# Patient Record
Sex: MALE | Race: WHITE | NOT HISPANIC OR LATINO | Employment: FULL TIME | URBAN - METROPOLITAN AREA
[De-identification: names, ages, dates, MRNs, and addresses within clinical notes are randomized per-mention and may not be internally consistent; named-entity substitution may affect disease eponyms.]

---

## 2017-01-13 DIAGNOSIS — F41.9 ANXIETY: Primary | ICD-10-CM

## 2017-01-13 DIAGNOSIS — F10.21 ALCOHOL DEPENDENCE IN REMISSION (H): ICD-10-CM

## 2017-01-13 RX ORDER — GABAPENTIN 300 MG/1
600 CAPSULE ORAL 3 TIMES DAILY
Qty: 540 CAPSULE | Refills: 0 | Status: SHIPPED | OUTPATIENT
Start: 2017-01-13 | End: 2017-01-18

## 2017-01-13 NOTE — TELEPHONE ENCOUNTER
Last seen: 7/13/16  RTC: 6 months  Cancel: none  No-show: none  Next appt: 1/18/17    Incoming refill from [facility] via fax    Medication requested: gabapentin 300 mg capsule  Directions: Take 2 capsules TID  Qty: 540  Last refilled: 10/16/16    Medication refill approved per refill protocol

## 2017-01-18 ENCOUNTER — OFFICE VISIT (OUTPATIENT)
Dept: PSYCHIATRY | Facility: CLINIC | Age: 37
End: 2017-01-18
Attending: CLINICAL NURSE SPECIALIST
Payer: COMMERCIAL

## 2017-01-18 DIAGNOSIS — F33.0 MAJOR DEPRESSIVE DISORDER, RECURRENT EPISODE, MILD (H): ICD-10-CM

## 2017-01-18 DIAGNOSIS — F41.9 ANXIETY: Primary | ICD-10-CM

## 2017-01-18 DIAGNOSIS — F10.21 ALCOHOL DEPENDENCE IN REMISSION (H): ICD-10-CM

## 2017-01-18 RX ORDER — GABAPENTIN 300 MG/1
600 CAPSULE ORAL 3 TIMES DAILY
Qty: 540 CAPSULE | Refills: 0 | Status: SHIPPED | OUTPATIENT
Start: 2017-01-18 | End: 2017-07-07

## 2017-01-18 RX ORDER — ESCITALOPRAM OXALATE 20 MG/1
30 TABLET ORAL DAILY
Qty: 135 TABLET | Refills: 1 | Status: SHIPPED | OUTPATIENT
Start: 2017-01-18 | End: 2017-07-19

## 2017-01-18 NOTE — PROGRESS NOTES
Outpatient Psychiatry Progress Note     Provider: ADI Salvador CNS  Date: 2017  Service:  Medication follow up with counseling.   Patient Identification: Scar Cheng  : 1980   MRN: [id number]    Scar Cheng is a 36 year old year old male who presents for ongoing psychiatric care.  Scar Cheng was last seen in clinic on 16.   At that time,   Assessment & Plan       Scar Cheng is seen today for follow up and reports he has been more anxious and mild depression due to stress of work, first child soon to be born. He would like to increase Lexapro to see if this helps with symptoms.    Diagnosis  Axis 1: Major Depression, Recurrent, Mild, Anxiety, Alcohol Dependence in remission  Axis 2: none  Axis 3: See problem list in the medical record    Plan:  Medication: Increase Lexapro to 30mg and continue Gabapentin  OTC Recommendations: none  Lab Orders:  none  Referrals: none  Release of Information: none  Future Treatment Considerations:per symptoms  Return for Follow Up:6 months               2017  Today Scar reports he and his wife had their daughter about 4 months ago. Things have been going well.   He is thinking of leaving work with [employer].  In general his life is going well and anything he worries about is more of a luxury. Has been sober 4 and 1/2 years.  Side effects of medication include: none known  Psychiatric Review of Systems:  The patient endorses symptoms of depression: In the last 2 weeks several days anhedonia, depressed, sleep disturbance, appetite disturbance, feeling of failure, concentration problems.  He  patient endorses symptoms of anxiety : stable.   He endorses symptoms of sue including none.    He endorses symptoms of psychosis including no psychotic symptoms.       Review of Medical Systems:  Sleep: overall stable  Energy: stable  Concentration: stable  Appetite: stable  GI Concerns: none  Cardiac concerns: none  Neurological  "concerns: none  Other medical concerns: no new concerns  Current Substance Use:  Alcohol:denies  Other drugs:denies  Caffeine:not discussed  Nicotine: none  Past Medical History:   Past Medical History   Diagnosis Date     Depressive disorder      Anxiety      Substance abuse      Alcohol dependence (H) 8/17/2012     Patient Active Problem List   Diagnosis     Family history of colon cancer     CARDIOVASCULAR SCREENING; LDL GOAL LESS THAN 160     Major depressive disorder, recurrent episode, mild (H)     Anxiety       Allergies:   Allergies   Allergen Reactions     Peanuts [Nuts] Anaphylaxis          Current Medications     Current Outpatient Prescriptions Ordered in [facility]   Medication Sig Dispense Refill     gabapentin (NEURONTIN) 300 MG capsule Take 2 capsules (600 mg) by mouth 3 times daily 540 capsule 0     escitalopram (LEXAPRO) 20 MG tablet Take 1.5 tablets (30 mg) by mouth daily 135 tablet 1     EPINEPHrine (EPIPEN) 0.3 MG/0.3ML injection Inject 0.3 mLs (0.3 mg) into the muscle once as needed for anaphylaxis 2 each 1     Multiple Vitamins-Minerals (MULTIVITAMIN PO)        Omega-3 Fatty Acids (OMEGA-3 FISH OIL PO)        Vitamin Mixture (VITAMIN E COMPLETE PO)        No current Epic-ordered facility-administered medications on file.        Mental Status Exam     Appearance:  Casually dressed and Well groomed  Behavior/relationship to examiner/demeanor:  Cooperative  Orientation: Oriented to person, place, time and situation  Psychomotor: normal form  Speech Rate:  Normal  Speech Spontaneity:  Normal  Mood:  \"good\"  Affect:  Appropriate/mood-congruent  Thought Process (Associations):  Goal directed  Thought Content:  no overt psychosis, denies suicidal ideation, intent or thoughts and patient does not appear to be responding to internal stimuli  Abnormal Perception:  None  Attention/Concentration:  Normal  Language:  Intact  Insight:  Good  Judgment:  Good      Results     Vital signs: There were no vitals taken " for this visit.    Laboratory Data:  no new data    Assessment & Plan      Scar Cheng is seen today for follow up and reports his mood has been stable and he thinks that the increase dose of Lexapro has been helpful without side effects. He agrees with plan to continue current medications.    Diagnosis  Axis 1: Major Depression, Recurrent Mild, Alcohol Dependence in Remission  Axis 2: none  Axis 3: See problem list in the medical record    Plan:  Medication: Continue current medications  OTC Recommendations: none  Lab Orders:  none  Referrals: none  Release of Information: none  Future Treatment Considerations:per symptoms  Return for Follow Up:6 months   The risks, benefits, alternatives and side effects have been discussed and are understood by the patient. The patient understands the risks of using street drugs or alcohol. There are no medical contraindications, the patient agrees to treatment, and has the capacity to do so. The patient understands to call 911 or come to the nearest ED if life threatening or urgent symptoms present.  Over 50% of this time was spent counseling the patient and/or coordinating care regarding review of social and occupational functioning.  In addition patient was counseled on health and wellness practices to augment medication treatment of symptoms. See note for details.    Radha Burdick, APRN CNS 1/18/2017

## 2017-01-25 ASSESSMENT — PATIENT HEALTH QUESTIONNAIRE - PHQ9: SUM OF ALL RESPONSES TO PHQ QUESTIONS 1-9: 7

## 2017-04-25 DIAGNOSIS — Z91.010 ALLERGY HISTORY, PEANUTS: ICD-10-CM

## 2017-04-25 RX ORDER — EPINEPHRINE 0.3 MG/.3ML
0.3 INJECTION SUBCUTANEOUS
Status: CANCELLED | OUTPATIENT
Start: 2017-04-25

## 2017-04-25 NOTE — TELEPHONE ENCOUNTER
Patient is due for a clinic visit. Telephone call to patient, he is scheduled for a clinic visit on 05/04/17 at 11am      Yadira Marks RN  [facility]

## 2017-04-25 NOTE — TELEPHONE ENCOUNTER
EPINEPHrine (EPIPEN) 0.3 MG/0.3ML injection       Last Written Prescription Date: 11-  Last Fill Quantity: 2 each,  # refills: 1   Last Office Visit with [facility], [facility]HA[facility] or [facility] prescribing provider: 07-

## 2017-07-07 DIAGNOSIS — F10.21 ALCOHOL DEPENDENCE IN REMISSION (H): ICD-10-CM

## 2017-07-07 DIAGNOSIS — F41.9 ANXIETY: ICD-10-CM

## 2017-07-07 RX ORDER — GABAPENTIN 300 MG/1
600 CAPSULE ORAL 3 TIMES DAILY
Qty: 180 CAPSULE | Refills: 0 | Status: SHIPPED | OUTPATIENT
Start: 2017-07-07 | End: 2017-07-19

## 2017-07-19 ENCOUNTER — OFFICE VISIT (OUTPATIENT)
Dept: PSYCHIATRY | Facility: CLINIC | Age: 37
End: 2017-07-19
Attending: CLINICAL NURSE SPECIALIST
Payer: COMMERCIAL

## 2017-07-19 DIAGNOSIS — N52.2 DRUG-INDUCED ERECTILE DYSFUNCTION: ICD-10-CM

## 2017-07-19 DIAGNOSIS — F41.9 ANXIETY: ICD-10-CM

## 2017-07-19 DIAGNOSIS — F33.0 MAJOR DEPRESSIVE DISORDER, RECURRENT EPISODE, MILD (H): Primary | ICD-10-CM

## 2017-07-19 DIAGNOSIS — F10.21 ALCOHOL DEPENDENCE IN REMISSION (H): ICD-10-CM

## 2017-07-19 RX ORDER — GABAPENTIN 300 MG/1
600 CAPSULE ORAL 3 TIMES DAILY
Qty: 180 CAPSULE | Refills: 0 | Status: SHIPPED | OUTPATIENT
Start: 2017-07-19 | End: 2017-09-08

## 2017-07-19 RX ORDER — ESCITALOPRAM OXALATE 20 MG/1
30 TABLET ORAL DAILY
Qty: 135 TABLET | Refills: 1 | Status: SHIPPED | OUTPATIENT
Start: 2017-07-19 | End: 2018-01-26

## 2017-07-19 RX ORDER — TADALAFIL 2.5 MG/1
2.5 TABLET ORAL DAILY
Qty: 30 TABLET | Refills: 1 | Status: SHIPPED | OUTPATIENT
Start: 2017-07-19

## 2017-07-19 NOTE — MR AVS SNAPSHOT
After Visit Summary   7/19/2017    Scar Cheng    MRN: [id number]           Patient Information     Date Of Birth          1980        Visit Information        Provider Department      7/19/2017 10:15 AM Radha Burdick APRN CNS [facility]        Today's Diagnoses     Major depressive disorder, recurrent episode, mild (H)    -  1    Anxiety        Drug-induced erectile dysfunction        Alcohol dependence in remission (H)           Follow-ups after your visit        Follow-up notes from your care team     Return in about 6 months (around 1/19/2018).      Your next 10 appointments already scheduled     Jan 17, 2018 10:15 AM [facility]   Adult Med Follow UP with ADI Salvador CNS   Psychiatry Clinic ([facility])    [place] [id number] [facility] [address] [phone]              Who to contact     Please call your clinic at [phone] to:    Ask questions about your health    Make or cancel appointments    Discuss your medicines    Learn about your test results    Speak to your doctor   If you have compliments or concerns about an experience at your clinic, or if you wish to file a complaint, please contact [facility] Patient Relations at [phone] or email us at [email or web address]         Additional Information About Your Visit        MyChart Information     [employer] is an electronic gateway that provides easy, online access to your medical records. With [employer], you can request a clinic appointment, read your test results, renew a prescription or communicate with your care team.     To sign up for [employer] visit the website at [email or web address]   You will be asked to enter the access code listed below, as well as some personal information. Please follow the directions to create your username and password.     Your access code is: [email or web address]  Expires: 8/15/2017  1:51  PM     Your access code will  in 90 days. If you need help or a new code, please contact your [facility] or call [phone] for assistance.        Care EveryWhere ID     This is your Care EveryWhere ID. This could be used by other organizations to access your [place] medical records  [id number]         Blood Pressure from Last 3 Encounters:   16 118/72   16 124/70   06/03/15 115/67    Weight from Last 3 Encounters:   16 96.4 kg (212 lb 9.6 oz)   16 92.1 kg (203 lb)   06/03/15 90.7 kg (200 lb)              Today, you had the following     No orders found for display         Today's Medication Changes          These changes are accurate as of: 17 11:59 PM.  If you have any questions, ask your nurse or doctor.               Start taking these medicines.        Dose/Directions    Tadalafil 2.5 MG Tabs   Commonly known as:  CIALIS   Used for:  Drug-induced erectile dysfunction        Dose:  2.5 mg   Take 2.5 mg by mouth daily Never use with nitroglycerin, terazosin or doxazosin.   Quantity:  30 tablet   Refills:  1            Where to get your medicines      These medications were sent to [place] [address] [place] [address]  [address] [place], [place] [id number]     Phone:  [phone]     escitalopram 20 MG tablet    gabapentin 300 MG capsule    Tadalafil 2.5 MG Tabs                Primary Care Provider Office Phone # Fax #    Chica Ridley DO [place] [phone]       XXX RESIGNED XXX [address] [facility] [id number]        Equal Access to Services     ILIA LOPEZ AH: Hadkristyn Agee, betty schmitt, qajaneen meadalruddy blunt. So [facility] [phone].    ATENCIÓN: Si habla español, tiene a nieves disposición servicios gratuitos de asistencia lingüística. Llame al [phone].    We comply with applicable federal civil rights laws and [place] laws. We  do not discriminate on the basis of race, color, national origin, age, disability sex, sexual orientation or gender identity.            Thank you!     Thank you for choosing PSYCHIATRY CLINIC  for your care. Our goal is always to provide you with excellent care. Hearing back from our patients is one way we can continue to improve our services. Please take a few minutes to complete the written survey that you may receive in the mail after your visit with us. Thank you!             Your Updated Medication List - Protect others around you: Learn how to safely use, store and throw away your medicines at www.disposemymeds.org.          This list is accurate as of: 7/19/17 11:59 PM.  Always use your most recent med list.                   Brand Name Dispense Instructions for use Diagnosis    EPINEPHrine 0.3 MG/0.3ML injection 2-pack    EPIPEN/ADRENACLICK/or ANY BX GENERIC EQUIV    2 each    Inject 0.3 mLs (0.3 mg) into the muscle once as needed for anaphylaxis    Allergy history, peanuts       escitalopram 20 MG tablet    LEXAPRO    135 tablet    Take 1.5 tablets (30 mg) by mouth daily    Anxiety, Major depressive disorder, recurrent episode, mild (H)       esomeprazole 20 MG CR capsule    nexIUM     Take 1 capsule (20 mg) by mouth every morning (before breakfast) Take 30-60 minutes before eating.        gabapentin 300 MG capsule    NEURONTIN    180 capsule    Take 2 capsules (600 mg) by mouth 3 times daily    Anxiety, Alcohol dependence in remission (H)       MULTIVITAMIN PO           OMEGA-3 FISH OIL PO           Tadalafil 2.5 MG Tabs    CIALIS    30 tablet    Take 2.5 mg by mouth daily Never use with nitroglycerin, terazosin or doxazosin.    Drug-induced erectile dysfunction       VITAMIN E COMPLETE PO

## 2017-07-19 NOTE — PROGRESS NOTES
Outpatient Psychiatry Progress Note     Provider: ADI Salvador CNS  Date: 2017  Service:  Medication follow up with counseling.   Patient Identification: Scar Cheng  : 1980   MRN: [id number]    Scar Cheng is a 37 year old year old male who presents for ongoing psychiatric care.  Scar Cheng was last seen in clinic on 17.   At that time,   Assessment & Plan       Scar Cheng is seen today for follow up and reports his mood has been stable and he thinks that the increase dose of Lexapro has been helpful without side effects. He agrees with plan to continue current medications.     Diagnosis  Axis 1: Major Depression, Recurrent Mild, Alcohol Dependence in Remission  Axis 2: none  Axis 3: See problem list in the medical record     Plan:  Medication: Continue current medications  OTC Recommendations: none  Lab Orders:  none  Referrals: none  Release of Information: none  Future Treatment Considerations:per symptoms  Return for Follow Up:6 months      ____________________________________________________________________________________________________________________________________________    2017  Today Scar reports feeling well. He is now working for [employer] instead of with [employer]. He just started last week so not sure how it  He will like it but looking forward to have a stable job.    He noted increase in stress with the last start up he had GI problems.  Started Nexium for this and seems to help.   Side effects of medication include: sexual side effects  Psychiatric Review of Systems:  The patient endorses symptoms of depression: In the last 2 weeks per PHQ 9 denies except several days fatigue, appetite disturbance.  He  patient endorses symptoms of anxiety : see subjective.   He endorses symptoms of sue including none.    He endorses symptoms of psychosis including no psychotic symptoms.       Review of Medical Systems:  Sleep: no concerns. He continues  "to nuha regular exercise.    Energy: mild  Concentration: stable  Appetite: changed due to GERD  GI Concerns: see subjective  Cardiac concerns: none  Neurological concerns: none  Other medical concerns: no other concerns  Current Substance Use:  Alcohol:sober for 5 years next month  Other drugs:none  Caffeine:daily  Nicotine: none  Past Medical History:   Past Medical History:   Diagnosis Date     Alcohol dependence (H) 8/17/2012     Anxiety      Depressive disorder      Substance abuse      Patient Active Problem List   Diagnosis     Family history of colon cancer     CARDIOVASCULAR SCREENING; LDL GOAL LESS THAN 160     Major depressive disorder, recurrent episode, mild (H)     Anxiety       Allergies:   Allergies   Allergen Reactions     Peanuts [Nuts] Anaphylaxis          Current Medications     Current Outpatient Prescriptions Ordered in [facility]   Medication Sig Dispense Refill     gabapentin (NEURONTIN) 300 MG capsule Take 2 capsules (600 mg) by mouth 3 times daily 180 capsule 0     escitalopram (LEXAPRO) 20 MG tablet Take 1.5 tablets (30 mg) by mouth daily 135 tablet 1     EPINEPHrine (EPIPEN) 0.3 MG/0.3ML injection Inject 0.3 mLs (0.3 mg) into the muscle once as needed for anaphylaxis 2 each 1     Multiple Vitamins-Minerals (MULTIVITAMIN PO)        Omega-3 Fatty Acids (OMEGA-3 FISH OIL PO)        Vitamin Mixture (VITAMIN E COMPLETE PO)        No current Epic-ordered facility-administered medications on file.         Mental Status Exam     Appearance:  Casually dressed and Well groomed  Behavior/relationship to examiner/demeanor:  Cooperative  Orientation: Oriented to person, place, time and situation  Psychomotor: normal form  Speech Rate:  Normal  Speech Spontaneity:  Normal  Mood:  \"good\"  Affect:  Appropriate/mood-congruent  Thought Process (Associations):  Goal directed  Thought Content:  no overt psychosis, denies suicidal ideation, intent or thoughts and patient does not appear to be responding to internal " stimuli  Abnormal Perception:  None  Attention/Concentration:  Normal  Language:  Intact  Insight:  Good  Judgment:  Good      Results     Vital signs: There were no vitals taken for this visit.    Laboratory Data:  no new data    Assessment & Plan      Scar Cheng is seen today for follow up and reports his mood has been stable but continues to have sexual side effects and wondering about use of ED medication to help. Will try Cialis.     Diagnosis  Axis 1: Major Depression, Recurrent, mild, Anxiety, Alcohol Dependence in remission  Axis 2: none  Axis 3: See problem list in the medical record    Plan:  Medication: Continue current medication of Lexapro, Gabapentin and will try Cialis 2.5mg for sexual side effects  OTC Recommendations: none  Lab Orders:  none  Referrals: none  Release of Information: none  Future Treatment Considerations:per symptoms, side effects  Return for Follow Up: 6 months   The risks, benefits, alternatives and side effects have been discussed and are understood by the patient. The patient understands the risks of using street drugs or alcohol. There are no medical contraindications, the patient agrees to treatment, and has the capacity to do so. The patient understands to call 911 or come to the nearest ED if life threatening or urgent symptoms present.  Over 50% of this time was spent counseling the patient and/or coordinating care regarding review of social and occupational functioning.  In addition patient was counseled on health and wellness practices to augment medication treatment of symptoms. See note for details.    Radha Burdick, ADI CNS 7/19/2017

## 2017-07-21 ASSESSMENT — PATIENT HEALTH QUESTIONNAIRE - PHQ9: SUM OF ALL RESPONSES TO PHQ QUESTIONS 1-9: 2

## 2017-09-02 ENCOUNTER — HOSPITAL ENCOUNTER (EMERGENCY)
Facility: CLINIC | Age: 37
Discharge: HOME | End: 2017-09-02
Attending: EMERGENCY MEDICINE | Admitting: EMERGENCY MEDICINE
Payer: COMMERCIAL

## 2017-09-02 VITALS
OXYGEN SATURATION: 99 % | SYSTOLIC BLOOD PRESSURE: 123 MMHG | DIASTOLIC BLOOD PRESSURE: 75 MMHG | BODY MASS INDEX: 27.77 KG/M2 | RESPIRATION RATE: 16 BRPM | HEART RATE: 65 BPM | HEIGHT: 72 IN | TEMPERATURE: 98.1 F | WEIGHT: 205 LBS

## 2017-09-02 DIAGNOSIS — S61.209A FINGERTIP AVULSION, INITIAL ENCOUNTER: ICD-10-CM

## 2017-09-02 PROCEDURE — 99283 EMERGENCY DEPT VISIT LOW MDM: CPT | Mod: 25

## 2017-09-02 PROCEDURE — 11760 REPAIR OF NAIL BED: CPT | Mod: F7

## 2017-09-02 ASSESSMENT — ENCOUNTER SYMPTOMS
WOUND: 1
CHILLS: 0

## 2017-09-02 NOTE — ED AVS SNAPSHOT
Emergency Department    [address] [facility] [address]    Phone:  [phone]    Fax:  [phone]                                       Scar Cheng   MRN: [id number]    Department:   Emergency Department   Date of Visit:  9/2/2017           Patient Information     Date Of Birth          1980        Your diagnoses for this visit were:     Fingertip avulsion, initial encounter        You were seen by Barber Selby MD.      Follow-up Information     Follow up with  Emergency Department.    Specialty:  EMERGENCY MEDICINE    Why:  As needed, If symptoms worsen    Contact information:    [address] [facility] [id number] [phone]        Discharge Instructions         Skin Tear (Skin Avulsion)  A skin avulsion is a tearing of the top layer of skin. This commonly happens after a fall or other injury. It also tends to be more common in older people, or those taking blood thinners or steroids for long periods of time.  Home care  These guidelines will help you care for your wound at home:    Keep the wound clean and dry for the first 24 to 48 hours, or as your healthcare provider advises.    If there is a dressing or bandage, change it when it gets wet or dirty. Otherwise, leave it on for the first 24 hours, then change it once a day or as often as the doctor says.    If stitches or staples were used, check the wound every day.    After taking off the dressing, wash the area gently with soap and water. Clean as close to the stitches as you can. Avoid washing or rubbing the stitches directly.    After 3 days you can keep the bandages off the wound, unless told otherwise, or there is continued drainage.  Allow the wound to be open to the air.    Keep a thin layer of antibiotic ointment on the cut. This will keep the wound clean, make it easier to remove the stitches, and reduce scarring.    If your wound is oozing, you can put a nonstick dressing over it. Then,  reapply the bandage or dressing as you were told.    You can shower as usual after the first 24 hours, but don't soak the area in water (no baths or swimming) until the stitches or staples are taken out.    If surgical tape was used, keep the area clean and dry. If it becomes wet, blot it dry with a clean towel.    If skin glue was used, don't put any creams, lotions, or antibiotic ointments on it. These can dissolve the glue. Usually the glue will flake off in about 5 to 10 days by itself. Try to resist picking it off before that so the wound doesn't open up. When it gets wet, pat it dry.  Here is some information about medicine:    You may use over-the-counter medicine such as acetaminophen or ibuprofen to control pain, unless another pain medicine was given. If you have chronic liver or kidney disease or ever had a stomach ulcer or gastrointestinal bleeding, talk with your doctor before using these medicines.    If you were given antibiotics, take them until they are all used up. It is important to finish the antibiotics even if the wound looks better. This will ensure that the infection has cleared.  Follow-up care  Follow up with your healthcare provider, or as advised.    Watch for any signs of infection, such as increasing redness, swelling, or pus coming out. If this happens, don't wait for your scheduled visit. Instead, see a doctor sooner.    Stitches or staples are usually taken out within 5 to 14 days. This varies depending on what part of your body they are on, and the type of wound. The doctor will tell you how long stitches should be left in.     If surgical tape was used, it is usually left on for 7 to 10 days. You can remove surgical tape after that unless you were told otherwise. If you try to remove it, and it is too hard, soaking can help. Surgical tape strips will eventually fall off on their own. If the edges of the cut pull apart, stop removing the tape or strips and follow up with your  doctor    As mentioned above, skin glue will flake off by itself in 5 to 10 days, so you don't need to pull it off.  If any X-rays were done, you will be notified of any changes that may affect your care.  When to seek medical advice  Call your healthcare provider right away if any of these occur:    Increasing pain in the wound    Redness, swelling, or pus coming from the wound    Fever of 100.4 F (38 C) or higher, or as directed by your healthcare provider    Sutures or staples come apart or fall out before your next appointment and the wound edges look as if they will re-open    Surgical tape closures fall off before 7 days, and the wound edges look as if they will re-open    Bleeding not controlled by direct pressure  Date Last Reviewed: 9/1/2016 2000-2017 [employer]. [address] [place] [address]. All rights reserved. This information is not intended as a substitute for professional medical care. Always follow your healthcare professional's instructions.          Keep dressing on for 5 days.    Future Appointments        Provider Department Dept Phone Center    1/17/2018 10:15 AM AD[facility] [phone] [facility]      24 Hour Appointment Hotline       To make an appointment at any [facility], call [phone] ([phone]). If you don't have a family doctor or clinic, we will help you find one. [facility] are conveniently located to serve the needs of you and your family.             Review of your medicines      Our records show that you are taking the medicines listed below. If these are incorrect, please call your family doctor or clinic.        Dose / Directions Last dose taken    EPINEPHrine 0.3 MG/0.3ML injection 2-pack   Commonly known as:  EPIPEN/ADRENACLICK/or ANY BX GENERIC EQUIV   Dose:  0.3 mg   Quantity:  2 each        Inject 0.3 mLs (0.3 mg) into the muscle once as needed for anaphylaxis   Refills:  1         escitalopram 20 MG tablet   Commonly known as:  LEXAPRO   Dose:  30 mg   Quantity:  135 tablet        Take 1.5 tablets (30 mg) by mouth daily   Refills:  1        esomeprazole 20 MG CR capsule   Commonly known as:  nexIUM   Dose:  20 mg        Take 1 capsule (20 mg) by mouth every morning (before breakfast) Take 30-60 minutes before eating.   Refills:  0        gabapentin 300 MG capsule   Commonly known as:  NEURONTIN   Dose:  600 mg   Quantity:  180 capsule        Take 2 capsules (600 mg) by mouth 3 times daily   Refills:  0        MULTIVITAMIN PO        Refills:  0        OMEGA-3 FISH OIL PO        Refills:  0        Tadalafil 2.5 MG Tabs   Commonly known as:  CIALIS   Dose:  2.5 mg   Quantity:  30 tablet        Take 2.5 mg by mouth daily Never use with nitroglycerin, terazosin or doxazosin.   Refills:  1        VITAMIN E COMPLETE PO        Refills:  0                Orders Needing Specimen Collection     None      Pending Results     No orders found from 8/31/2017 to 9/3/2017.            Pending Culture Results     No orders found from 8/31/2017 to 9/3/2017.            Pending Results Instructions     If you had any lab results that were not finalized at the time of your Discharge, you can call the ED Lab Result RN at [phone]. You will be contacted by this team for any positive Lab results or changes in treatment. The nurses are available 7 days a week from 10A to 6:30P.  You can leave a message 24 hours per day and they will return your call.        Test Results From Your Hospital Stay               Clinical Quality Measure: Blood Pressure Screening     Your blood pressure was checked while you were in the emergency department today. The last reading we obtained was  BP: 123/75 . Please read the guidelines below about what these numbers mean and what you should do about them.  If your systolic blood pressure (the top number) is less than 120 and your diastolic blood pressure (the bottom number) is less than  "80, then your blood pressure is normal. There is nothing more that you need to do about it.  If your systolic blood pressure (the top number) is 120-139 or your diastolic blood pressure (the bottom number) is 80-89, your blood pressure may be higher than it should be. You should have your blood pressure rechecked within a year by a primary care provider.  If your systolic blood pressure (the top number) is 140 or greater or your diastolic blood pressure (the bottom number) is 90 or greater, you may have high blood pressure. High blood pressure is treatable, but if left untreated over time it can put you at risk for heart attack, stroke, or kidney failure. You should have your blood pressure rechecked by a primary care provider within the next 4 weeks.  If your provider in the emergency department today gave you specific instructions to follow-up with your doctor or provider even sooner than that, you should follow that instruction and not wait for up to 4 weeks for your follow-up visit.        Thank you for choosing [facility]       Thank you for choosing [facility] for your care. Our goal is always to provide you with excellent care. Hearing back from our patients is one way we can continue to improve our services. Please take a few minutes to complete the written survey that you may receive in the mail after you visit with us. Thank you!        [employer] Information     [employer] lets you send messages to your doctor, view your test results, renew your prescriptions, schedule appointments and more. To sign up, go to [email or web address] . Click on \"Log in\" on the left side of the screen, which will take you to the Welcome page. Then click on \"Sign up Now\" on the right side of the page.     You will be asked to enter the access code listed below, as well as some personal information. Please follow the directions to create your username and password.     Your access code is: [email or web address]  Expires: 12/1/2017  2:14 PM   "   Your access code will  in 90 days. If you need help or a new code, please call your [facility] clinic or [phone].        Care EveryWhere ID     This is your Care EveryWhere ID. This could be used by other organizations to access your [facility] medical records  [id number]        Equal Access to Services     ILIA LOPEZ : Tyron georgeo Sobrody, waaxda luqadaha, qaybta kaalmada akhil, ruddy soto. So [facility] [phone].    ATENCIÓN: Si habla español, tiene a nieves disposición servicios gratuitos de asistencia lingüística. Llame al [phone].    We comply with applicable federal civil rights laws and [place] laws. We do not discriminate on the basis of race, color, national origin, age, disability sex, sexual orientation or gender identity.            After Visit Summary       This is your record. Keep this with you and show to your community pharmacist(s) and doctor(s) at your next visit.

## 2017-09-02 NOTE — ED PROVIDER NOTES
History     Chief Complaint:  Laceration    HPI   Scar Cheng is a left hand dominant  37 year old male who presents with a laceration to his 3rd and 4th fingers on his right hand near the tip and on the nail plate. The patient states that he cut this on a mandolin.    Allergies:  NKDA    Medications:    Nexium  Cialis  Neurontin  Lexapro  Epipen    Past Medical History:    Alcohol dependence  Anxiety  Depression  Substance abuse  Anxiety    Past Surgical History:    Orthopedic surgery    Family History:    No past pertinent family history.      Social History:  Former smoker  Alcohol use positive  Marital Status:       Review of Systems   Constitutional: Negative for chills.   Skin: Positive for wound.   Neurological: Negative for syncope.   All other systems reviewed and are negative.      Physical Exam   First Vitals:  /75  Pulse 65  Temp 98.1  F (36.7  C) (Oral)  Resp 16  Ht 1.829 m (6')  Wt 93 kg (205 lb)  SpO2 99%  BMI 27.8 kg/m2     Physical Exam   General: Resting comfortably on the gurney  Head:  The scalp, face, and head appear normal  Eyes:  The pupils are normal    Conjunctivae and sclera appear normal  ENT:    The nose is normal    Ears/pinnae are normal  Neck:  Normal range of motion  MS:  Right hand, middle finger: There is a 1 cm avulsion to the dorsal radial aspect of the finger tip. About 1/3 of the nail plate   has been avulsed. There is a injury to the nail bed but nothing suturable.    Right hand, ring finger: There is a 0.5 cm similar injury to the dorsal distal nail plate bed and fingertip. Again this is non   suturable. Both areas have significant dermal bleeding.   Skin:  No rash or lesions noted.  Neuro: Speech is normal and fluent  Psych:  Awake. Alert.  Normal affect.      Appropriate interactions      Emergency Department Course     Procedures:   Digital Block and Cautery     PROCEDURE:  Digital Block  LOCATION:  Right hand, 3rd and 4th finger  ANESTHESIA:  Digital block using 0.5% Marcaine without Epi, total of 4 mLs to both fingers.  PROCEDURE NOTE:  The patient tolerated the procedure well with good relief of discomfort and there were no complications.  The nailbeds required cautery with silver nitrate.  Nonstick dressings were applied by myself    Emergency Department Course:  Nursing notes and vitals reviewed.  I performed an exam of the patient as documented above.     12:39 I anesthetized the patient's fingers for a digital block.  13:30 I anesthetized  patient's fingers again as he still noted feeling in these fingers dorsally  13:53 I performed the digital block as documented above.    Findings and plan explained to the Patient. Patient discharged home with instructions regarding supportive care, medications, and reasons to return. The importance of close follow-up was reviewed.      Impression & Plan      Medical Decision Making:  Scar Cheng is a 37 year old male who presents with fingertip nail plate and nail bed avulsions as noted above. The patient's fingertips were cauterized with silver nitrate topically after transient ring tunicates were placed. There was no further bleeding. The patient had Gelfoam, Xeroform and Vaseline Brayden placed to the fingers. This is a nonadherent dressing. Curlex was placed to both fingers as well. The dressings were performed by myself.     The patient's father became quite upset about the delays in the emergency department. I mentioned to the father that there were several critical care resuscitations underway and some very complex patients in the emergency department. The patient himself was very respectful and kind the entire visit but the patient's father seemed unhappy with the delays. The patient's father did finally settle and they left after the repair.     Diagnosis:    ICD-10-CM    1. Fingertip avulsion, initial encounter S61.209A        Disposition:  discharged to home    Discharge Medications:  Discharge  Medication List as of 9/2/2017  2:15 PM          I, Channing Wing, am serving as a scribe on 9/2/2017 at 12:17 PM to personally document services performed by Barber Selby MD based on my observations and the provider's statements to me.     9/2/2017    EMERGENCY DEPARTMENT       Barber Selby MD  09/02/17 [id number]

## 2017-09-02 NOTE — ED AVS SNAPSHOT
Emergency Department    [address] [place] [address]    Phone:  [phone]    Fax:  [phone]                                       Scar Cheng   MRN: [id number]    Department:   Emergency Department   Date of Visit:  9/2/2017           After Visit Summary Signature Page     I have received my discharge instructions, and my questions have been answered. I have discussed any challenges I see with this plan with the nurse or doctor.    ..........................................................................................................................................  Patient/Patient Representative Signature      ..........................................................................................................................................  Patient Representative Print Name and Relationship to Patient    ..................................................               ................................................  Date                                            Time    ..........................................................................................................................................  Reviewed by Signature/Title    ...................................................              ..............................................  Date                                                            Time

## 2017-09-02 NOTE — DISCHARGE INSTRUCTIONS
Skin Tear (Skin Avulsion)  A skin avulsion is a tearing of the top layer of skin. This commonly happens after a fall or other injury. It also tends to be more common in older people, or those taking blood thinners or steroids for long periods of time.  Home care  These guidelines will help you care for your wound at home:    Keep the wound clean and dry for the first 24 to 48 hours, or as your healthcare provider advises.    If there is a dressing or bandage, change it when it gets wet or dirty. Otherwise, leave it on for the first 24 hours, then change it once a day or as often as the doctor says.    If stitches or staples were used, check the wound every day.    After taking off the dressing, wash the area gently with soap and water. Clean as close to the stitches as you can. Avoid washing or rubbing the stitches directly.    After 3 days you can keep the bandages off the wound, unless told otherwise, or there is continued drainage.  Allow the wound to be open to the air.    Keep a thin layer of antibiotic ointment on the cut. This will keep the wound clean, make it easier to remove the stitches, and reduce scarring.    If your wound is oozing, you can put a nonstick dressing over it. Then, reapply the bandage or dressing as you were told.    You can shower as usual after the first 24 hours, but don't soak the area in water (no baths or swimming) until the stitches or staples are taken out.    If surgical tape was used, keep the area clean and dry. If it becomes wet, blot it dry with a clean towel.    If skin glue was used, don't put any creams, lotions, or antibiotic ointments on it. These can dissolve the glue. Usually the glue will flake off in about 5 to 10 days by itself. Try to resist picking it off before that so the wound doesn't open up. When it gets wet, pat it dry.  Here is some information about medicine:    You may use over-the-counter medicine such as acetaminophen or ibuprofen to control pain,  unless another pain medicine was given. If you have chronic liver or kidney disease or ever had a stomach ulcer or gastrointestinal bleeding, talk with your doctor before using these medicines.    If you were given antibiotics, take them until they are all used up. It is important to finish the antibiotics even if the wound looks better. This will ensure that the infection has cleared.  Follow-up care  Follow up with your healthcare provider, or as advised.    Watch for any signs of infection, such as increasing redness, swelling, or pus coming out. If this happens, don't wait for your scheduled visit. Instead, see a doctor sooner.    Stitches or staples are usually taken out within 5 to 14 days. This varies depending on what part of your body they are on, and the type of wound. The doctor will tell you how long stitches should be left in.     If surgical tape was used, it is usually left on for 7 to 10 days. You can remove surgical tape after that unless you were told otherwise. If you try to remove it, and it is too hard, soaking can help. Surgical tape strips will eventually fall off on their own. If the edges of the cut pull apart, stop removing the tape or strips and follow up with your doctor    As mentioned above, skin glue will flake off by itself in 5 to 10 days, so you don't need to pull it off.  If any X-rays were done, you will be notified of any changes that may affect your care.  When to seek medical advice  Call your healthcare provider right away if any of these occur:    Increasing pain in the wound    Redness, swelling, or pus coming from the wound    Fever of 100.4 F (38 C) or higher, or as directed by your healthcare provider    Sutures or staples come apart or fall out before your next appointment and the wound edges look as if they will re-open    Surgical tape closures fall off before 7 days, and the wound edges look as if they will re-open    Bleeding not controlled by direct pressure  Date  Last Reviewed: 9/1/2016 2000-2017 [employer]. [address] [place] [address], [place] [address]. All rights reserved. This information is not intended as a substitute for professional medical care. Always follow your healthcare professional's instructions.          Keep dressing on for 5 days.

## 2017-09-08 DIAGNOSIS — F10.21 ALCOHOL DEPENDENCE IN REMISSION (H): ICD-10-CM

## 2017-09-08 DIAGNOSIS — F41.9 ANXIETY: ICD-10-CM

## 2017-09-08 RX ORDER — GABAPENTIN 300 MG/1
600 CAPSULE ORAL 3 TIMES DAILY
Qty: 180 CAPSULE | Refills: 4 | Status: SHIPPED | OUTPATIENT
Start: 2017-09-08 | End: 2017-10-16

## 2017-09-09 NOTE — TELEPHONE ENCOUNTER
Medication requested: gabapentin 300 mg caps  Last refilled: 8-5-17  Qty: 180      Last seen: 7-19-17  RTC: 6 mo  Cancel: 0  No-show: 0  Next appt: 1-17-18    Refill decision: Refilled for 30 days per protocol. With 4 additional refills.      Kathleen M Doege RN

## 2017-10-16 DIAGNOSIS — F41.9 ANXIETY: ICD-10-CM

## 2017-10-16 DIAGNOSIS — F10.21 ALCOHOL DEPENDENCE IN REMISSION (H): ICD-10-CM

## 2017-10-16 RX ORDER — GABAPENTIN 300 MG/1
600 CAPSULE ORAL 3 TIMES DAILY
Qty: 540 CAPSULE | Refills: 0 | Status: SHIPPED | OUTPATIENT
Start: 2017-10-16 | End: 2018-01-17

## 2018-01-17 DIAGNOSIS — F10.21 ALCOHOL DEPENDENCE IN REMISSION (H): ICD-10-CM

## 2018-01-17 DIAGNOSIS — F41.9 ANXIETY: ICD-10-CM

## 2018-01-17 RX ORDER — GABAPENTIN 300 MG/1
600 CAPSULE ORAL 3 TIMES DAILY
Qty: 180 CAPSULE | Refills: 0 | Status: SHIPPED | OUTPATIENT
Start: 2018-01-17 | End: 2018-01-26

## 2018-01-17 NOTE — TELEPHONE ENCOUNTER
Medication requested: Gabapentin 300 mg caps  Last refilled: 10-16-17  Qty: 540      Last seen: 7-19-17  RTC: 6 mo  Cancel: 1  No-show: 0  Next appt: 1-26-18    Refill decision: Refill pended and routed to the provider for review/determination due to Cancellation x1.    Kathleen M Doege RN

## 2018-01-26 ENCOUNTER — OFFICE VISIT (OUTPATIENT)
Dept: PSYCHIATRY | Facility: CLINIC | Age: 38
End: 2018-01-26
Attending: CLINICAL NURSE SPECIALIST
Payer: COMMERCIAL

## 2018-01-26 VITALS
HEART RATE: 75 BPM | SYSTOLIC BLOOD PRESSURE: 122 MMHG | WEIGHT: 204.2 LBS | DIASTOLIC BLOOD PRESSURE: 66 MMHG | BODY MASS INDEX: 27.69 KG/M2

## 2018-01-26 DIAGNOSIS — F33.0 MAJOR DEPRESSIVE DISORDER, RECURRENT EPISODE, MILD (H): ICD-10-CM

## 2018-01-26 DIAGNOSIS — F10.21 ALCOHOL DEPENDENCE IN REMISSION (H): ICD-10-CM

## 2018-01-26 DIAGNOSIS — F41.9 ANXIETY: ICD-10-CM

## 2018-01-26 PROCEDURE — G0463 HOSPITAL OUTPT CLINIC VISIT: HCPCS | Mod: ZF

## 2018-01-26 RX ORDER — GABAPENTIN 300 MG/1
600 CAPSULE ORAL 3 TIMES DAILY
Qty: 540 CAPSULE | Refills: 1 | Status: SHIPPED | OUTPATIENT
Start: 2018-01-26 | End: 2018-07-25

## 2018-01-26 RX ORDER — ESCITALOPRAM OXALATE 20 MG/1
30 TABLET ORAL DAILY
Qty: 135 TABLET | Refills: 1 | Status: SHIPPED | OUTPATIENT
Start: 2018-01-26 | End: 2018-07-25

## 2018-01-26 ASSESSMENT — PAIN SCALES - GENERAL: PAINLEVEL: NO PAIN (0)

## 2018-01-26 NOTE — MR AVS SNAPSHOT
After Visit Summary   2018    Scar Cheng    MRN: [id number]           Patient Information     Date Of Birth          1980        Visit Information        Provider Department      2018 8:45 AM Radha Burdick APRN [facility]        Today's Diagnoses     Anxiety        Major depressive disorder, recurrent episode, mild (H)        Alcohol dependence in remission (H)           Follow-ups after your visit        Follow-up notes from your care team     Return in about 6 months (around 2018).      Your next 10 appointments already scheduled     2018  8:45 AM CDT   Adult Med Follow UP with ADI Salvador CNS   Psychiatry Clinic ([facility])    [address] [facility] [address] [phone]              Who to contact     Please call your clinic at [phone] to:    Ask questions about your health    Make or cancel appointments    Discuss your medicines    Learn about your test results    Speak to your doctor            Additional Information About Your Visit        MyChart Information     Wahanda is an electronic gateway that provides easy, online access to your medical records. With Wahanda, you can request a clinic appointment, read your test results, renew a prescription or communicate with your care team.     To sign up for Wahanda visit the website at [email or web address]   You will be asked to enter the access code listed below, as well as some personal information. Please follow the directions to create your username and password.     Your access code is: [email or web address]  Expires: 2018  1:05 PM     Your access code will  in 90 days. If you need help or a new code, please contact your [facility] or call [phone] for assistance.        Care EveryWhere ID     This is your Care EveryWhere ID. This could be used by other organizations to  access your [place] medical records  [id number]        Your Vitals Were     Pulse BMI (Body Mass Index)                75 27.69 kg/m2           Blood Pressure from Last 3 Encounters:   01/26/18 122/66   09/02/17 123/75   07/13/16 118/72    Weight from Last 3 Encounters:   01/26/18 92.6 kg (204 lb 3.2 oz)   09/02/17 93 kg (205 lb)   07/13/16 96.4 kg (212 lb 9.6 oz)              Today, you had the following     No orders found for display         Where to get your medicines      These medications were sent to [place] [id number] [place] [facility] [address] [place] [address] [address]  [address] [facility] [address]     Phone:  [phone]     escitalopram 20 MG tablet    gabapentin 300 MG capsule          Primary Care Provider Fax #    Physician No Ref-Primary [phone]       No address on file        Equal Access to Services     [facility]: Hadii nick Agee, waaxda luafia, qaybta kaalmada adeanu, ruddy nava . So [facility] [phone].    ATENCIÓN: Si habla español, tiene a nieves disposición servicios gratuitos de asistencia lingüística. Llame al [phone].    We comply with applicable federal civil rights laws and [place] laws. We do not discriminate on the basis of race, color, national origin, age, disability, sex, sexual orientation, or gender identity.            Thank you!     Thank you for choosing PS[facility] CLINIC  for your care. Our goal is always to provide you with excellent care. Hearing back from our patients is one way we can continue to improve our services. Please take a few minutes to complete the written survey that you may receive in the mail after your visit with us. Thank you!             Your Updated Medication List - Protect others around you: Learn how to safely use, store and throw away your medicines at www.disposemymeds.org.          This list is accurate as of 1/26/18 11:59 PM.  Always use your most recent med list.                   Brand  Name Dispense Instructions for use Diagnosis    EPINEPHrine 0.3 MG/0.3ML injection 2-pack    EPIPEN/ADRENACLICK/or ANY BX GENERIC EQUIV    2 each    Inject 0.3 mLs (0.3 mg) into the muscle once as needed for anaphylaxis    Allergy history, peanuts       escitalopram 20 MG tablet    LEXAPRO    135 tablet    Take 1.5 tablets (30 mg) by mouth daily    Anxiety, Major depressive disorder, recurrent episode, mild (H)       esomeprazole 20 MG CR capsule    nexIUM     Take 1 capsule (20 mg) by mouth every morning (before breakfast) Take 30-60 minutes before eating.        gabapentin 300 MG capsule    NEURONTIN    540 capsule    Take 2 capsules (600 mg) by mouth 3 times daily    Anxiety, Alcohol dependence in remission (H)       MULTIVITAMIN PO           OMEGA-3 FISH OIL PO           tadalafil 2.5 MG tablet    CIALIS    30 tablet    Take 2.5 mg by mouth daily Never use with nitroglycerin, terazosin or doxazosin.    Drug-induced erectile dysfunction       VITAMIN E COMPLETE PO

## 2018-01-26 NOTE — PROGRESS NOTES
Outpatient Psychiatry Progress Note     Provider: ADI Salvador CNS  Date: 2018  Service:  Medication follow up with counseling.   Patient Identification: Scar Cheng  : 1980   MRN: [id number]    Scar Cheng is a 37 year old year old male who presents for ongoing psychiatric care.  Scar Cheng was last seen in clinic on 17.   At that time,   Assessment & Plan       Scar Cheng is seen today for follow up and reports his mood has been stable but continues to have sexual side effects and wondering about use of ED medication to help. Will try Cialis.      Diagnosis  Axis 1: Major Depression, Recurrent, mild, Anxiety, Alcohol Dependence in remission  Axis 2: none  Axis 3: See problem list in the medical record     Plan:  Medication: Continue current medication of Lexapro, Gabapentin and will try Cialis 2.5mg for sexual side effects  OTC Recommendations: none  Lab Orders:  none  Referrals: none  Release of Information: none  Future Treatment Considerations:per symptoms, side effects  Return for Follow Up: 6 months      ____________________________________________________________________________________________________________________________________________    2018  Today Scar reports he was able to get Cialis and this helped but the side effect has resolved on it's own. He makes a point to not let his thoughts go on if he is worried about it.  Symptoms are still helped a lot by medication. Work stress does bother him but managing this.  Side effects of medication include: none   Psychiatric Review of Systems:  Depression: In the last 2 weeks per PHQ 9 several days anhedonia, feeling depressed, sleep disturbance, appetite disturbance, feelings of failure, concentration problems.   Anxiety : stable  Luh none   Psychosis  none.   ADHD N/A    Review of Medical Systems:  Sleep: mild due to work stress  Energy: mild  Concentration: mild  Appetite: none  GI Concerns: no  "concerns  Cardiac concerns: none  Neurological concerns: none  Other medical concerns: none  Current Substance Use:  Alcohol:denies   Other drugs:none  Caffeine:no change  Nicotine: none  Past Medical History:   Past Medical History:   Diagnosis Date     Alcohol dependence (H) 8/17/2012     Anxiety      Depressive disorder      Substance abuse      Patient Active Problem List   Diagnosis     Family history of colon cancer     CARDIOVASCULAR SCREENING; LDL GOAL LESS THAN 160     Major depressive disorder, recurrent episode, mild (H)     Anxiety       Allergies:   Allergies   Allergen Reactions     Peanuts [Nuts] Anaphylaxis          Current Medications     Current Outpatient Prescriptions Ordered in [facility]   Medication Sig Dispense Refill     gabapentin (NEURONTIN) 300 MG capsule Take 2 capsules (600 mg) by mouth 3 times daily 180 capsule 0     esomeprazole (NEXIUM) 20 MG CR capsule Take 1 capsule (20 mg) by mouth every morning (before breakfast) Take 30-60 minutes before eating.       Tadalafil (CIALIS) 2.5 MG TABS Take 2.5 mg by mouth daily Never use with nitroglycerin, terazosin or doxazosin. 30 tablet 1     escitalopram (LEXAPRO) 20 MG tablet Take 1.5 tablets (30 mg) by mouth daily 135 tablet 1     EPINEPHrine (EPIPEN) 0.3 MG/0.3ML injection Inject 0.3 mLs (0.3 mg) into the muscle once as needed for anaphylaxis 2 each 1     Multiple Vitamins-Minerals (MULTIVITAMIN PO)        Omega-3 Fatty Acids (OMEGA-3 FISH OIL PO)        Vitamin Mixture (VITAMIN E COMPLETE PO)        No current Epic-ordered facility-administered medications on file.         Mental Status Exam     Appearance:  Casually dressed and Well groomed  Behavior/relationship to examiner/demeanor:  Cooperative  Orientation: Oriented to person, place, time and situation  Psychomotor: normal form  Speech Rate:  Normal  Speech Spontaneity:  Normal  Mood:  \"good\"  Affect:  Appropriate/mood-congruent  Thought Process (Associations):  Goal directed  Thought " Content:  no overt psychosis, denies suicidal ideation, intent or thoughts and patient does not appear to be responding to internal stimuli  Abnormal Perception:  None  Attention/Concentration:  Normal  Insight:  Good  Judgment:  Good      Results     Vital signs: /66  Pulse 75  Wt 92.6 kg (204 lb 3.2 oz)  BMI 27.69 kg/m2    Laboratory Data:  no new data    Assessment & Plan      Scar hCeng is seen today for follow up and reports his mood has been stable and managing stress.  Continues sober and would like to continue current medications.    Diagnosis  Major Depression, Recurrent, mild, Anxiety, Alcohol Dependence in remission      Plan:  Medication: Continue current medication  OTC Recommendations: none  Lab Orders:  none  Referrals: none  Release of Information: none  Future Treatment Considerations:per symptoms  Return for Follow Up:6 months   The risks, benefits, alternatives and side effects have been discussed and are understood by the patient. The patient understands the risks of using street drugs or alcohol. There are no medical contraindications, the patient agrees to treatment, and has the capacity to do so. The patient understands to call 911 or come to the nearest ED if life threatening or urgent symptoms present.  In addition time was spent counseling the patient and/or coordinating care regarding review of social and occupational functioning.  In addition patient was counseled on health and wellness practices to augment medication treatment of symptoms. See note for details.    Radha Burdick, APRN CNS 1/26/2018

## 2018-01-26 NOTE — NURSING NOTE
Chief Complaint   Patient presents with     Recheck Medication     MDD       Obtained weight, blood pressure and heart rate, pain level, and smoking status   Reviewed allergies, medications, and pharmacy preference  Administered abuse screening questions

## 2018-07-25 ENCOUNTER — OFFICE VISIT (OUTPATIENT)
Dept: PSYCHIATRY | Facility: CLINIC | Age: 38
End: 2018-07-25
Attending: CLINICAL NURSE SPECIALIST
Payer: COMMERCIAL

## 2018-07-25 VITALS
SYSTOLIC BLOOD PRESSURE: 121 MMHG | BODY MASS INDEX: 27.04 KG/M2 | DIASTOLIC BLOOD PRESSURE: 76 MMHG | WEIGHT: 199.4 LBS | HEART RATE: 61 BPM

## 2018-07-25 DIAGNOSIS — F10.21 ALCOHOL DEPENDENCE IN REMISSION (H): ICD-10-CM

## 2018-07-25 DIAGNOSIS — F41.9 ANXIETY: ICD-10-CM

## 2018-07-25 DIAGNOSIS — F33.0 MAJOR DEPRESSIVE DISORDER, RECURRENT EPISODE, MILD (H): ICD-10-CM

## 2018-07-25 PROCEDURE — G0463 HOSPITAL OUTPT CLINIC VISIT: HCPCS | Mod: ZF

## 2018-07-25 RX ORDER — GABAPENTIN 300 MG/1
600 CAPSULE ORAL 3 TIMES DAILY
Qty: 540 CAPSULE | Refills: 1 | Status: SHIPPED | OUTPATIENT
Start: 2018-07-25 | End: 2018-09-07

## 2018-07-25 RX ORDER — ESCITALOPRAM OXALATE 20 MG/1
30 TABLET ORAL DAILY
Qty: 135 TABLET | Refills: 1 | Status: SHIPPED | OUTPATIENT
Start: 2018-07-25 | End: 2018-09-07

## 2018-07-25 RX ORDER — BUPROPION HYDROCHLORIDE 150 MG/1
TABLET ORAL
Qty: 60 TABLET | Refills: 1 | Status: SHIPPED | OUTPATIENT
Start: 2018-07-25 | End: 2018-09-07 | Stop reason: DRUGHIGH

## 2018-07-25 ASSESSMENT — PAIN SCALES - GENERAL: PAINLEVEL: NO PAIN (0)

## 2018-07-25 NOTE — MR AVS SNAPSHOT
After Visit Summary   7/25/2018    Scar Cheng    MRN: [id number]           Patient Information     Date Of Birth          1980        Visit Information        Provider Department      7/25/2018 3:45 PM Radha Burdick APRN [facility]        Today's Diagnoses     Anxiety        Major depressive disorder, recurrent episode, mild (H)        Alcohol dependence in remission (H)           Follow-ups after your visit        Follow-up notes from your care team     Return in about 6 weeks (around 9/5/2018).      Your next 10 appointments already scheduled     Sep 07, 2018  8:45 AM CDT   Adult Med Follow UP with ADI Salvador CNS   Psychiatry Clinic ([facility])    [place] [id number] [address] [phone]              Who to contact     Please call your clinic at [phone] to:    Ask questions about your health    Make or cancel appointments    Discuss your medicines    Learn about your test results    Speak to your doctor            Additional Information About Your Visit        MyCharMiso Information     HyperBees gives you secure access to your electronic health record. If you see a primary care provider, you can also send messages to your care team and make appointments. If you have questions, please call your primary care clinic.  If you do not have a primary care provider, please call [phone] and they will assist you.      HyperBees is an electronic gateway that provides easy, online access to your medical records. With HyperBees, you can request a clinic appointment, read your test results, renew a prescription or communicate with your care team.     To access your existing account, please contact your [facility] or call [phone] for assistance.        Care EveryWhere ID     This is your Care EveryWhere ID. This could be used by other organizations to access your  [place] medical records  [id number]        Your Vitals Were     Pulse BMI (Body Mass Index)                61 27.04 kg/m2           Blood Pressure from Last 3 Encounters:   07/25/18 121/76   01/26/18 122/66   09/02/17 123/75    Weight from Last 3 Encounters:   07/25/18 90.4 kg (199 lb 6.4 oz)   01/26/18 92.6 kg (204 lb 3.2 oz)   09/02/17 93 kg (205 lb)              Today, you had the following     No orders found for display         Today's Medication Changes          These changes are accurate as of 7/25/18 11:59 PM.  If you have any questions, ask your nurse or doctor.               Start taking these medicines.        Dose/Directions    buPROPion 150 MG 24 hr tablet   Commonly known as:  WELLBUTRIN XL   Used for:  Major depressive disorder, recurrent episode, mild (H)   Started by:  Radha Burdick APRN CNS        Take one tablet by mouth every morning for two weeks then increase to two tablets every morning if no side effects   Quantity:  60 tablet   Refills:  1            Where to get your medicines      These medications were sent to [place] [id number] [place] [facility] [address] [place] [address]  [address] [facility] [address]     Phone:  [phone]     buPROPion 150 MG 24 hr tablet    escitalopram 20 MG tablet    gabapentin 300 MG capsule                Primary Care Provider Fax #    Physician No Ref-Primary [phone]       No address on file        Equal Access to Services     [facility] : Hadii nick georgeo Sobrody, waaxda luqadaha, qaybta kaalmada akhil, ruddy nava . So [facility] [phone].    ATENCIÓN: Si habla español, tiene a ineves disposición servicios gratuitos de asistencia lingüística. Llame al [phone].    We comply with applicable federal civil rights laws and [place] laws. We do not discriminate on the basis of race, color, national origin, age, disability, sex, sexual orientation, or gender identity.            Thank you!     Thank you  for choosing PS[facility]ATRY CLINIC  for your care. Our goal is always to provide you with excellent care. Hearing back from our patients is one way we can continue to improve our services. Please take a few minutes to complete the written survey that you may receive in the mail after your visit with us. Thank you!             Your Updated Medication List - Protect others around you: Learn how to safely use, store and throw away your medicines at www.disposemymeds.org.          This list is accurate as of 7/25/18 11:59 PM.  Always use your most recent med list.                   Brand Name Dispense Instructions for use Diagnosis    buPROPion 150 MG 24 hr tablet    WELLBUTRIN XL    60 tablet    Take one tablet by mouth every morning for two weeks then increase to two tablets every morning if no side effects    Major depressive disorder, recurrent episode, mild (H)       EPINEPHrine 0.3 MG/0.3ML injection 2-pack    EPIPEN/ADRENACLICK/or ANY BX GENERIC EQUIV    2 each    Inject 0.3 mLs (0.3 mg) into the muscle once as needed for anaphylaxis    Allergy history, peanuts       escitalopram 20 MG tablet    LEXAPRO    135 tablet    Take 1.5 tablets (30 mg) by mouth daily    Anxiety, Major depressive disorder, recurrent episode, mild (H)       gabapentin 300 MG capsule    NEURONTIN    540 capsule    Take 2 capsules (600 mg) by mouth 3 times daily    Anxiety, Alcohol dependence in remission (H)       MULTIVITAMIN PO           OMEGA-3 FISH OIL PO           tadalafil 2.5 MG tablet    CIALIS    30 tablet    Take 2.5 mg by mouth daily Never use with nitroglycerin, terazosin or doxazosin.    Drug-induced erectile dysfunction       VITAMIN E COMPLETE PO

## 2018-07-25 NOTE — PROGRESS NOTES
Outpatient Psychiatry Progress Note     Provider: ADI Salvador CNS  Date: 2018  Service:  Medication follow up with counseling.   Patient Identification: Scar Cheng  : 1980   MRN: [id number]    Scar Cheng is a 38 year old year old male who presents for ongoing psychiatric care.  Scar Cheng was last seen in clinic on 18.   At that time,   Assessment & Plan       Scar Cheng is seen today for follow up and reports his mood has been stable and managing stress.  Continues sober and would like to continue current medications.     Diagnosis  Major Depression, Recurrent, mild, Anxiety, Alcohol Dependence in remission        Plan:  Medication: Continue current medication  OTC Recommendations: none  Lab Orders:  none  Referrals: none  Release of Information: none  Future Treatment Considerations:per symptoms  Return for Follow Up:6 months      ____________________________________________________________________________________________________________________________________________    2018  Today Scar reports he is feeling ok.  Has been feeling a little down.  Work has not been going well and he tends to isolate.     His daughter is 2 years old and they are expecting a son due in January.     Some worry about work and family.  Side effects of medication include: none  Psychiatric Review of Systems:  Depression: In the last 2 weeks per PHQ 9 several days anhedonia, feeling depressed,sleep disturbance, feelings of failure, concentration problems  Anxiety : stable  Luh :na   Psychosis  na.   ADHD na    Review of Medical Systems:   Sleep: stable with some sleep disturbance  Energy: mild  Concentration: moderate  Appetite: mild  GI Concerns: none  Cardiac concerns: none  Neurological concerns: none  Other medical concerns: no new concerns  Current Substance Use:  Alcohol:sober 6 weeks  Other drugs:denies  Caffeine:coffee a few cups in the am and occasionally during the  "day  Nicotine: none  Past Medical History:   Past Medical History:   Diagnosis Date     Alcohol dependence (H) 8/17/2012     Anxiety      Depressive disorder      Substance abuse      Patient Active Problem List   Diagnosis     Family history of colon cancer     CARDIOVASCULAR SCREENING; LDL GOAL LESS THAN 160     Major depressive disorder, recurrent episode, mild (H)     Anxiety       Allergies:   Allergies   Allergen Reactions     Peanuts [Nuts] Anaphylaxis          Current Medications     Current Outpatient Prescriptions Ordered in [facility]   Medication Sig Dispense Refill     EPINEPHrine (EPIPEN) 0.3 MG/0.3ML injection Inject 0.3 mLs (0.3 mg) into the muscle once as needed for anaphylaxis 2 each 1     escitalopram (LEXAPRO) 20 MG tablet Take 1.5 tablets (30 mg) by mouth daily 135 tablet 1     esomeprazole (NEXIUM) 20 MG CR capsule Take 1 capsule (20 mg) by mouth every morning (before breakfast) Take 30-60 minutes before eating.       gabapentin (NEURONTIN) 300 MG capsule Take 2 capsules (600 mg) by mouth 3 times daily 540 capsule 1     Multiple Vitamins-Minerals (MULTIVITAMIN PO)        Omega-3 Fatty Acids (OMEGA-3 FISH OIL PO)        Tadalafil (CIALIS) 2.5 MG TABS Take 2.5 mg by mouth daily Never use with nitroglycerin, terazosin or doxazosin. 30 tablet 1     Vitamin Mixture (VITAMIN E COMPLETE PO)        No current Epic-ordered facility-administered medications on file.         Mental Status Exam     Appearance:  Casually dressed and Well groomed  Behavior/relationship to examiner/demeanor:  Cooperative  Orientation: Oriented to person, place, time and situation  Psychomotor: normal form  Speech Rate:  Normal  Speech Spontaneity:  Normal  Mood:  \"okay\"  Affect:  Appropriate/mood-congruent  Thought Process (Associations):  Goal directed  Thought Content:  no overt psychosis, denies suicidal ideation, intent or thoughts and patient does not appear to be responding to internal stimuli  Abnormal Perception:  " None  Attention/Concentration:  Normal  Insight:  Good  Judgment:  Good      Results     Vital signs: /76  Pulse 61  Wt 90.4 kg (199 lb 6.4 oz)  BMI 27.04 kg/m2    Laboratory Data:  no new results    Assessment & Plan      Scar Cheng is seen today for follow up and reports he has been more depressed the last few months is wondering about adding Wellbutrin which his mother has had a good effect with.    Diagnosis  Major Depression, Recurrent, mild, Anxiety, Alcohol Dependence in remission    Plan:  Medication: Continue current medications and dose of gabapentin and Lexapro. Add Wellbutrin XL 150mg for two week then increase to 300mg.  OTC Recommendations: none  Lab Orders:  none  Referrals: none  Release of Information: none  Future Treatment Considerations:per  Return for Follow Up:6 weeks   The risks, benefits, alternatives and side effects have been discussed and are understood by the patient. The patient understands the risks of using street drugs or alcohol. There are no medical contraindications, the patient agrees to treatment, and has the capacity to do so. The patient understands to call 911 or come to the nearest ED if life threatening or urgent symptoms present.  In addition time was spent counseling the patient and/or coordinating care regarding review of social and occupational functioning.  In addition patient was counseled on health and wellness practices to augment medication treatment of symptoms. See note for details.    Radha Burdick, APRN CNS 7/25/2018

## 2018-07-27 ASSESSMENT — PATIENT HEALTH QUESTIONNAIRE - PHQ9: SUM OF ALL RESPONSES TO PHQ QUESTIONS 1-9: 7

## 2018-09-07 ENCOUNTER — OFFICE VISIT (OUTPATIENT)
Dept: PSYCHIATRY | Facility: CLINIC | Age: 38
End: 2018-09-07
Attending: CLINICAL NURSE SPECIALIST
Payer: COMMERCIAL

## 2018-09-07 VITALS
HEART RATE: 55 BPM | SYSTOLIC BLOOD PRESSURE: 110 MMHG | DIASTOLIC BLOOD PRESSURE: 72 MMHG | WEIGHT: 196.4 LBS | BODY MASS INDEX: 26.64 KG/M2

## 2018-09-07 DIAGNOSIS — F10.21 ALCOHOL DEPENDENCE IN REMISSION (H): ICD-10-CM

## 2018-09-07 DIAGNOSIS — F33.0 MAJOR DEPRESSIVE DISORDER, RECURRENT EPISODE, MILD (H): Primary | ICD-10-CM

## 2018-09-07 DIAGNOSIS — F41.9 ANXIETY: ICD-10-CM

## 2018-09-07 PROCEDURE — G0463 HOSPITAL OUTPT CLINIC VISIT: HCPCS | Mod: ZF

## 2018-09-07 RX ORDER — BUPROPION HYDROCHLORIDE 300 MG/1
300 TABLET ORAL EVERY MORNING
Qty: 90 TABLET | Refills: 1 | Status: SHIPPED | OUTPATIENT
Start: 2018-09-07 | End: 2019-03-07

## 2018-09-07 RX ORDER — GABAPENTIN 300 MG/1
600 CAPSULE ORAL 3 TIMES DAILY
Qty: 540 CAPSULE | Refills: 1 | Status: SHIPPED | OUTPATIENT
Start: 2018-09-07 | End: 2019-03-07

## 2018-09-07 RX ORDER — ESCITALOPRAM OXALATE 20 MG/1
30 TABLET ORAL DAILY
Qty: 135 TABLET | Refills: 1 | Status: SHIPPED | OUTPATIENT
Start: 2018-09-07 | End: 2019-03-07

## 2018-09-07 ASSESSMENT — PAIN SCALES - GENERAL: PAINLEVEL: NO PAIN (0)

## 2018-09-07 NOTE — MR AVS SNAPSHOT
After Visit Summary   9/7/2018    Scar Cheng    MRN: [id number]           Patient Information     Date Of Birth          1980        Visit Information        Provider Department      9/7/2018 8:45 AM Radha Burdick APRN [facility]        Today's Diagnoses     Major depressive disorder, recurrent episode, mild (H)    -  1    Anxiety        Alcohol dependence in remission (H)           Follow-ups after your visit        Follow-up notes from your care team     Return in about 6 months (around 3/7/2019).      Your next 10 appointments already scheduled     Mar 07, 2019  8:45 AM [facility]   Adult Med Follow UP with ADI Salvador CNS   Psychiatry Clinic ([facility])    [place] [id number] [address] [address] [phone]              Who to contact     Please call your clinic at [phone] to:    Ask questions about your health    Make or cancel appointments    Discuss your medicines    Learn about your test results    Speak to your doctor            Additional Information About Your Visit        MyChart Information     [employer] gives you secure access to your electronic health record. If you see a primary care provider, you can also send messages to your care team and make appointments. If you have questions, please call your primary care clinic.  If you do not have a primary care provider, please call [phone] and they will assist you.      [employer] is an electronic gateway that provides easy, online access to your medical records. With [employer], you can request a clinic appointment, read your test results, renew a prescription or communicate with your care team.     To access your existing account, please contact your [facility] or call [phone] for assistance.        Care EveryWhere ID     This is your Care EveryWhere ID. This could be used by other organizations to access your  [facility] medical records  [id number]        Your Vitals Were     Pulse BMI (Body Mass Index)                55 26.64 kg/m2           Blood Pressure from Last 3 Encounters:   09/07/18 110/72   07/25/18 121/76   01/26/18 122/66    Weight from Last 3 Encounters:   09/07/18 89.1 kg (196 lb 6.4 oz)   07/25/18 90.4 kg (199 lb 6.4 oz)   01/26/18 92.6 kg (204 lb 3.2 oz)              Today, you had the following     No orders found for display         Today's Medication Changes          These changes are accurate as of 9/7/18 11:59 PM.  If you have any questions, ask your nurse or doctor.               These medicines have changed or have updated prescriptions.        Dose/Directions    buPROPion 300 MG 24 hr tablet   Commonly known as:  WELLBUTRIN XL   This may have changed:    - medication strength  - how much to take  - how to take this  - when to take this  - additional instructions   Used for:  Major depressive disorder, recurrent episode, mild (H)   Changed by:  Radha Burdick APRN CNS        Dose:  300 mg   Take 1 tablet (300 mg) by mouth every morning   Quantity:  90 tablet   Refills:  1            Where to get your medicines      These medications were sent to [place] [address] [place] [address]  [address] [place] [address]     Phone:  [phone]     buPROPion 300 MG 24 hr tablet    escitalopram 20 MG tablet    gabapentin 300 MG capsule                Primary Care Provider Fax #    Physician No Ref-Primary [phone]       No address on file        Equal Access to Services     MALACHI LOPEZ AH: Hadii aad ku hadasho Soomaali, waaxda luqadaha, qaybta kaalmada adeegyada, ruddy soto. So [facility] [phone].    ATENCIÓN: Si habla español, tiene a nieves disposición servicios gratuitos de asistencia lingüística. Llame al [phone].    We comply with applicable federal civil rights laws and [place] laws. We do not discriminate on the basis of race,  color, national origin, age, disability, sex, sexual orientation, or gender identity.            Thank you!     Thank you for choosing PSY[facility]ATRY CLINIC  for your care. Our goal is always to provide you with excellent care. Hearing back from our patients is one way we can continue to improve our services. Please take a few minutes to complete the written survey that you may receive in the mail after your visit with us. Thank you!             Your Updated Medication List - Protect others around you: Learn how to safely use, store and throw away your medicines at www.disposemymeds.org.          This list is accurate as of 9/7/18 11:59 PM.  Always use your most recent med list.                   Brand Name Dispense Instructions for use Diagnosis    buPROPion 300 MG 24 hr tablet    WELLBUTRIN XL    90 tablet    Take 1 tablet (300 mg) by mouth every morning    Major depressive disorder, recurrent episode, mild (H)       EPINEPHrine 0.3 MG/0.3ML injection 2-pack    EPIPEN/ADRENACLICK/or ANY BX GENERIC EQUIV    2 each    Inject 0.3 mLs (0.3 mg) into the muscle once as needed for anaphylaxis    Allergy history, peanuts       escitalopram 20 MG tablet    LEXAPRO    135 tablet    Take 1.5 tablets (30 mg) by mouth daily    Anxiety, Major depressive disorder, recurrent episode, mild (H)       gabapentin 300 MG capsule    NEURONTIN    540 capsule    Take 2 capsules (600 mg) by mouth 3 times daily    Anxiety, Alcohol dependence in remission (H)       MULTIVITAMIN PO           OMEGA-3 FISH OIL PO           tadalafil 2.5 MG tablet    CIALIS    30 tablet    Take 2.5 mg by mouth daily Never use with nitroglycerin, terazosin or doxazosin.    Drug-induced erectile dysfunction       VITAMIN E COMPLETE PO

## 2018-09-07 NOTE — PROGRESS NOTES
Outpatient Psychiatry Progress Note     Provider: ADI Salvador CNS  Date: 2018  Service:  Medication follow up with counseling.   Patient Identification: Scar Cheng  : 1980   MRN: [id number]    Scar Cheng is a 38 year old year old male who presents for ongoing psychiatric care.  Scar Cheng was last seen in clinic on 18.   At that time,   Assessment & Plan      Scar Cheng is seen today for follow up and reports he has been more depressed the last few months is wondering about adding Wellbutrin which his mother has had a good effect with.     Diagnosis  Major Depression, Recurrent, mild, Anxiety, Alcohol Dependence in remission     Plan:  Medication: Continue current medications and dose of gabapentin and Lexapro. Add Wellbutrin XL 150mg for two week then increase to 300mg.  OTC Recommendations: none  Lab Orders:  none  Referrals: none  Release of Information: none  Future Treatment Considerations:per  Return for Follow Up:6 weeks  ____________________________________________________________________________________________________________________________________________    2018  Today Scar reports he is doing well with the start of Wellbutrin. He is now taking 300mg daily with good effect.     Side effects of medication include: He wakes up a couple of times during the night. Vivid dreams, nightmares.   Psychiatric Review of Systems:  Depression: In the last 2 weeks per PHQ 9 PHQ-9 score: 2 several days having difficulty sleeping and feeling tired  PHQ-9 SCORE 2018   Total Score -   Total Score 7     Anxiety: he reports feeling occasional anxiety but it subsides quickly   Luh: n/a   Psychosis: n/a   ADHD: n/a    Review of Medical Systems:  Sleep: wakes up a couple of times per night  Energy: a little low  Concentration: stable  Appetite: stable  GI Concerns: none  Cardiac concerns: none  Neurological concerns: none  Other medical concerns: no new  "concerns  Current Substance Use:  Alcohol: sober for 6 years   Other drugs:denies  Caffeine: decreased use   Nicotine: denies  Past Medical History:   Past Medical History:   Diagnosis Date     Alcohol dependence (H) 8/17/2012     Anxiety      Depressive disorder      Substance abuse      Patient Active Problem List   Diagnosis     Family history of colon cancer     CARDIOVASCULAR SCREENING; LDL GOAL LESS THAN 160     Major depressive disorder, recurrent episode, mild (H)     Anxiety       Allergies:   Allergies   Allergen Reactions     Peanuts [Nuts] Anaphylaxis          Current Medications     Current Outpatient Prescriptions Ordered in [facility]   Medication Sig Dispense Refill     buPROPion (WELLBUTRIN XL) 150 MG 24 hr tablet Take one tablet by mouth every morning for two weeks then increase to two tablets every morning if no side effects 60 tablet 1     EPINEPHrine (EPIPEN) 0.3 MG/0.3ML injection Inject 0.3 mLs (0.3 mg) into the muscle once as needed for anaphylaxis 2 each 1     escitalopram (LEXAPRO) 20 MG tablet Take 1.5 tablets (30 mg) by mouth daily 135 tablet 1     gabapentin (NEURONTIN) 300 MG capsule Take 2 capsules (600 mg) by mouth 3 times daily 540 capsule 1     Multiple Vitamins-Minerals (MULTIVITAMIN PO)        Omega-3 Fatty Acids (OMEGA-3 FISH OIL PO)        Tadalafil (CIALIS) 2.5 MG TABS Take 2.5 mg by mouth daily Never use with nitroglycerin, terazosin or doxazosin. 30 tablet 1     Vitamin Mixture (VITAMIN E COMPLETE PO)        No current Epic-ordered facility-administered medications on file.         Mental Status Exam     Appearance:  Casually dressed  Behavior/relationship to examiner/demeanor:  Cooperative, Engaged and Pleasant  Orientation: Oriented to person, place, time and situation  Psychomotor: normal form  Speech Rate:  Normal  Speech Spontaneity:  Normal  Mood:  \"good\"  Affect:  Appropriate/mood-congruent  Thought Process (Associations):  Goal directed  Thought Content:  no overt psychosis " and denies suicidal ideation, intent or thoughts  Abnormal Perception:  None  Attention/Concentration:  Normal  Insight:  Good  Judgment:  Good      Results     Vital signs: /72  Pulse 55  Wt 89.1 kg (196 lb 6.4 oz)  BMI 26.64 kg/m2    Laboratory Data:  no new data    Assessment & Plan     Scar Cheng is seen today for follow up and reports feeling balanced and stable. He reports his anxiety has improved with the use of Wellbutrin. He states he has been sober from alcohol for 6 years. States work is going well although he is looking for a new job. He is having a baby boy in January.     Diagnosis  Encounter Diagnoses   Name Primary?     Major depressive disorder, recurrent episode, mild (H) Yes     Anxiety        Plan:  Medication: continue current medications   OTC Recommendations: none  Lab Orders:  none  Referrals: none  Release of Information: none  Future Treatment Considerations: per symptoms  Return for Follow Up:6 months   The risks, benefits, alternatives and side effects have been discussed and are understood by the patient. The patient understands the risks of using street drugs or alcohol. There are no medical contraindications, the patient agrees to treatment, and has the capacity to do so. The patient understands to call 911 or come to the nearest ED if life threatening or urgent symptoms present.  In addition time was spent counseling the patient and/or coordinating care regarding review of social and occupational functioning.  In addition patient was counseled on health and wellness practices to augment medication treatment of symptoms. See note for details.    Sarah Candelario, Psychiatric/Mental Health Nurse Practitioner Student, acting as scribe for ADI Salvador CNS    I , Radha Burdick, personally performed the entire clinical encounter as documented by Sarah Candelario RN, DNP student      ADI Salvador 9/7/2018

## 2018-09-11 ASSESSMENT — PATIENT HEALTH QUESTIONNAIRE - PHQ9: SUM OF ALL RESPONSES TO PHQ QUESTIONS 1-9: 2

## 2019-03-07 ENCOUNTER — OFFICE VISIT (OUTPATIENT)
Dept: PSYCHIATRY | Facility: CLINIC | Age: 39
End: 2019-03-07
Attending: CLINICAL NURSE SPECIALIST
Payer: COMMERCIAL

## 2019-03-07 VITALS
BODY MASS INDEX: 28.89 KG/M2 | DIASTOLIC BLOOD PRESSURE: 77 MMHG | HEART RATE: 92 BPM | SYSTOLIC BLOOD PRESSURE: 118 MMHG | WEIGHT: 213 LBS

## 2019-03-07 DIAGNOSIS — F33.0 MAJOR DEPRESSIVE DISORDER, RECURRENT EPISODE, MILD (H): ICD-10-CM

## 2019-03-07 DIAGNOSIS — F10.21 ALCOHOL DEPENDENCE IN REMISSION (H): ICD-10-CM

## 2019-03-07 DIAGNOSIS — F41.9 ANXIETY: ICD-10-CM

## 2019-03-07 RX ORDER — BUPROPION HYDROCHLORIDE 300 MG/1
300 TABLET ORAL EVERY MORNING
Qty: 90 TABLET | Refills: 1 | Status: SHIPPED | OUTPATIENT
Start: 2019-03-07 | End: 2019-09-04

## 2019-03-07 RX ORDER — ESCITALOPRAM OXALATE 20 MG/1
30 TABLET ORAL DAILY
Qty: 135 TABLET | Refills: 1 | Status: SHIPPED | OUTPATIENT
Start: 2019-03-07 | End: 2019-09-19

## 2019-03-07 RX ORDER — GABAPENTIN 300 MG/1
600 CAPSULE ORAL 3 TIMES DAILY
Qty: 540 CAPSULE | Refills: 1 | Status: SHIPPED | OUTPATIENT
Start: 2019-03-07 | End: 2019-09-19

## 2019-03-07 ASSESSMENT — PAIN SCALES - GENERAL: PAINLEVEL: NO PAIN (0)

## 2019-03-07 ASSESSMENT — PATIENT HEALTH QUESTIONNAIRE - PHQ9: SUM OF ALL RESPONSES TO PHQ QUESTIONS 1-9: 5

## 2019-03-07 NOTE — PATIENT INSTRUCTIONS
Thank you for coming to the PSYCHIATRY CLINIC.    Lab Testing:  If you had lab testing today and your results are reassuring or normal they will be mailed to you or sent through [employer] within 7 days.   If the lab tests need quick action we will call you with the results.  The phone number we will call with results is # [phone] (home) . If this is not the best number please call our clinic and change the number.    Medication Refills:  If you need any refills please call your pharmacy and they will contact us. Our fax number for refills is [phone]. Please allow three business for refill processing.   If you need to  your refill at a new pharmacy, please contact the new pharmacy directly. The new pharmacy will help you get your medications transferred.     Scheduling:  If you have any concerns about today's visit or wish to schedule another appointment please call our office during normal business hours [phone] (8-5:00 M-F)    Contact Us:  Please call [phone] during business hours (8-5:00 M-F).  If after clinic hours, or on the weekend, please call  [phone].    Financial Assistance [phone]  [facility] [employer] Billing [phone]  [facility] Billing [phone]  Medical Records [phone]      MENTAL HEALTH CRISIS NUMBERS:  [facility]:   [facility] - [phone]   Crisis Residence [facility] - [phone]   Walk-In Counseling [facility] [phone]   COPE 24/7 [facility] Mobile Team for Adults - [[phone]]; Child - [[phone]]     Crisis Connection - [phone]     [facility]:   [facility] - [phone]   Walk-in counseling [facility] - [phone]   Walk-in counseling [facility] - [phone]   Crisis Residence [facility] - [phone]   Urgent Care Adult Mental Health:   --Drop-in, 24/7 crisis line, and [employer] Mobile Team [[phone]]    CRISIS TEXT  LINE: Text [phone] from anywhere, anytime, any crisis 24/7;    OR SEE www.crisistextline.org     Poison Control Center - [phone]    CHILD: [facility] needs assessment team - [phone]     [facility] - [phone]; or [employer] [facility] - [phone]    If you have a medical emergency please call 911or go to the nearest ER.                    _____________________________________________    Again thank you for choosing PSYCHIATRY CLINIC and please let us know how we can best partner with you to improve you and your family's health.  You may be receiving a survey in the mail regarding this appointment. We would love to have your feedback, both positive and negative, so please fill out the survey and return it using the provided envelope. The survey is done by an external company, so your answers are anonymous.

## 2019-03-07 NOTE — NURSING NOTE
Chief Complaint   Patient presents with     RECHECK     Major depressive disorder, recurrent episode, mild

## 2019-03-07 NOTE — PROGRESS NOTES
Outpatient Psychiatry Progress Note     Provider: ADI Salvador CNS  Date: 3/7/2019  Service:  Medication follow up with counseling.   Patient Identification: Scar Cheng  : 1980   MRN: [id number]    Scar Cheng is a 38 year old year old male who presents for ongoing psychiatric care.  Scar Cheng was last seen in clinic on 18.   At that time,   Assessment & Plan      Scar Cheng is seen today for follow up and reports feeling balanced and stable. He reports his anxiety has improved with the use of Wellbutrin. He states he has been sober from alcohol for 6 years. States work is going well although he is looking for a new job. He is having a baby boy in January.      Diagnosis       Encounter Diagnoses   Name Primary?     Major depressive disorder, recurrent episode, mild (H) Yes     Anxiety           Plan:  Medication: continue current medications   OTC Recommendations: none  Lab Orders:  none  Referrals: none  Release of Information: none  Future Treatment Considerations: per symptoms  Return for Follow Up:6 months      ____________________________________________________________________________________________________________________________________________    2019  Today Scar reports he is feeling well. Son was born in January and doing well. Some times sleeping through the night. Anxiety a little higher as he lost his job the week before . Had not been getting along with his boss and so had some speculation that it was coming. Got 3 months severance. Has not secured a permenant job yet.   Side effects of medication include: none reported  Psychiatric Review of Systems:  Depression: In the last 2 weeks per PHQ-9 score:   PHQ-9 SCORE 3/7/2019   PHQ-9 Total Score -   PHQ-9 Total Score 5       Anxiety : situational  Luh na   Psychosis  na.   ADHD na    Review of Medical Systems:  Sleep: no concerns  Energy: mild  Concentration: mild  Appetite: stable  GI  "Concerns: none  Cardiac concerns: none  Neurological concerns: none  Other medical concerns: no new concerns  Current Substance Use:  Alcohol:contininued sober  Other drugs:none  Caffeine:no reviewed  Nicotine: none  Past Medical History:   Past Medical History:   Diagnosis Date     Alcohol dependence (H) 8/17/2012     Anxiety      Depressive disorder      Substance abuse (H)      Patient Active Problem List   Diagnosis     Family history of colon cancer     CARDIOVASCULAR SCREENING; LDL GOAL LESS THAN 160     Major depressive disorder, recurrent episode, mild (H)     Anxiety       Allergies:   Allergies   Allergen Reactions     Peanuts [Nuts] Anaphylaxis          Current Medications     Current Outpatient Medications Ordered in Epic   Medication Sig Dispense Refill     buPROPion (WELLBUTRIN XL) 300 MG 24 hr tablet Take 1 tablet (300 mg) by mouth every morning 90 tablet 1     EPINEPHrine (EPIPEN) 0.3 MG/0.3ML injection Inject 0.3 mLs (0.3 mg) into the muscle once as needed for anaphylaxis 2 each 1     escitalopram (LEXAPRO) 20 MG tablet Take 1.5 tablets (30 mg) by mouth daily 135 tablet 1     gabapentin (NEURONTIN) 300 MG capsule Take 2 capsules (600 mg) by mouth 3 times daily 540 capsule 1     Multiple Vitamins-Minerals (MULTIVITAMIN PO)        Omega-3 Fatty Acids (OMEGA-3 FISH OIL PO)        Tadalafil (CIALIS) 2.5 MG TABS Take 2.5 mg by mouth daily Never use with nitroglycerin, terazosin or doxazosin. 30 tablet 1     Vitamin Mixture (VITAMIN E COMPLETE PO)        No current Epic-ordered facility-administered medications on file.         Mental Status Exam     Appearance:  Casually dressed and Well groomed  Behavior/relationship to examiner/demeanor:  Cooperative  Orientation: Oriented to person, place, time and situation  Psychomotor: normal form  Speech Rate:  Normal  Speech Spontaneity:  Normal  Mood:  \"okay\"  Affect:  Appropriate/mood-congruent  Thought Process (Associations):  Goal directed  Thought Content:  " no overt psychosis, denies suicidal ideation, intent or thoughts and patient does not appear to be responding to internal stimuli  Abnormal Perception:  None  Attention/Concentration:  Normal  Insight:  Good  Judgment:  Good      Results     Vital signs: /77   Pulse 92   Wt 96.6 kg (213 lb)   BMI 28.89 kg/m      Laboratory Data:  no new data    Assessment & Plan      Scar Cheng is seen today for follow up and reports he is stressed by not being employed since the week before Christmas due to a falling out with his . Second child was born in January and is glad to be home to help with children.  Continues to be sober and mood has been stable except for situational worry    Diagnosis  Encounter Diagnoses   Name Primary?     Anxiety      Major depressive disorder, recurrent episode, mild (H)      Alcohol dependence in remission (H)        Plan:  Medication: Continue current medication  OTC Recommendations: none  Lab Orders:  none  Referrals: none  Release of Information: none  Future Treatment Considerations:per symptoms  Return for Follow Up:6 months   The risks, benefits, alternatives and side effects have been discussed and are understood by the patient. The patient understands the risks of using street drugs or alcohol. There are no medical contraindications, the patient agrees to treatment, and has the capacity to do so. The patient understands to call 911 or come to the nearest ED if life threatening or urgent symptoms present.  In addition time was spent counseling the patient and/or coordinating care regarding review of social and occupational functioning.  In addition patient was counseled on health and wellness practices to augment medication treatment of symptoms. See note for details.    Radha Burdick, APRN CNS 3/7/2019

## 2019-05-08 ENCOUNTER — OFFICE VISIT (OUTPATIENT)
Dept: UROLOGY | Facility: CLINIC | Age: 39
End: 2019-05-08
Payer: COMMERCIAL

## 2019-05-08 VITALS
SYSTOLIC BLOOD PRESSURE: 104 MMHG | WEIGHT: 205 LBS | OXYGEN SATURATION: 98 % | DIASTOLIC BLOOD PRESSURE: 70 MMHG | BODY MASS INDEX: 27.17 KG/M2 | HEART RATE: 83 BPM | HEIGHT: 73 IN

## 2019-05-08 DIAGNOSIS — Z30.2 ENCOUNTER FOR STERILIZATION: Primary | ICD-10-CM

## 2019-05-08 PROCEDURE — 99202 OFFICE O/P NEW SF 15 MIN: CPT | Performed by: UROLOGY

## 2019-05-08 ASSESSMENT — MIFFLIN-ST. JEOR: SCORE: 1893.75

## 2019-05-08 ASSESSMENT — PAIN SCALES - GENERAL: PAINLEVEL: NO PAIN (0)

## 2019-05-08 NOTE — PATIENT INSTRUCTIONS
[facility] UROLOGY  Vasectomy Information  [phone]    DATE OF PROCEDURE ___________________________________    TIME OF PROCEDURE ___________________________    TIME TO REPORT FOR PROCEDURE _______________________________    Your Physician:  _____ Hari Blanton M.D.     _____ Humphrey Cortez M.D.     _____ Bala Frias M.D.    LOCATION OF PROCEDURE:     _____ [facility]  _____ [address]   [address]   [address] [facility].  [address]    [place]   [address]    [place]   [address]    Preoperative Instructions:    _____ You may have breakfast on the morning of your procedure.  If your procedure is    in the afternoon, you may have lunch as well.    _____ You must have someone drive you home after the procedure if you have been    prescribed an oral sedative (valium).    _____ Do not take any aspirin, blood-thinning or anti-inflammatory medication for at    least 7-10 days before the procedure (this includes but is not limited to Advil,   Aleve, Ecotrin and Bufferin).      Postoperative Instructions Follow Vasectomy    Under routine circumstances, please note the following:    -No heavy lifting (over 15 lbs) for 48 hour.  -You may shower after 48 hours.  You may have a tub bath or use a swimming pool after one week postoperatively.  Your doctor will advise you if he feels it is helpful to soak in a bathtub postoperatively.  -Do not engage in intercourse for at least ten days and then proceed when comfortable.  -Wear an athletic supporter for 48 hours postoperatively or until any discomfort ceases.  -Your physician will instruct you regarding the use of ice in the recovery room or at home after the procedure, as necessary.  -Please remember as you resume your activity that you may experience some discomfor and/or swelling for the one to two weeks following the procedure.  If this occurs decrease activity and slightly elevate the scrotum (athletic  "supporter).  -As your stitches dissolve it may appear that the incision is \"gaping.\"  This is normal.    Necessary follow-up:  You do not need a follow up appointment unless you have problems after your procedure.  Please call our office at [phone] if you do.    At the time of your procedure you will be given the supplies for your post procedure follow up.  The test should be done AT LEAST THREE MONTHS AFTER your vasecomy.  During this time, be certain to maintain birth control measure!    Between the time of your procedure and the time that you have your first sperm count it is very important that you have a minimum of 30 ejaculations.  If you have any questions about this, please consult your physician.    If the sperm count that is done at three months is negative, you will be considered sterile.  Until, you are told that you are free to discontinue birth control you are considered fertile.    If your sperm counts reveal the presence of sperm, you will be advised to repeat the test until a negative result is obtained.     NOTE:  Please call our office one week after dropping off your sample for the result.  Test results will only be given to the patient.         "

## 2019-05-08 NOTE — NURSING NOTE
Chief Complaint   Patient presents with     Clinic Care Coordination - Follow-up     Pt here for vasectomy consult     Debbie Joseph CMA

## 2019-05-08 NOTE — LETTER
5/8/2019     RE: Scar Cheng  [address]  [facility] [address]     Dear Colleague,    Thank you for referring your patient, Scar Cheng, to the [facility] [place] [facility]. Please see a copy of my visit note below.    Scar is a pleasant 39-year-old male who is  with 2 children and wishes to be sterilized.  He is read the article on vasectomy and this was re-reviewed with the patient.  The procedure, the alternatives, risks and follow-up were carefully discussed  Other past medical history: History of depression, anxiety, alcohol dependence, substance abuse, non-smoker  Medications: Wellbutrin XL, EpiPen, Lexapro, Neurontin, multivitamin, fish oil, Cialis, vitamin D  Allergies: Peanuts  Review of systems: Negative  Exam: Alert and oriented, normal appearance, normal vitals.  Normal respirations, neuro grossly intact.  Normal phallus with normal urethral meatus glands.  Normal scrotum.  Both testicles descended without masses or tenderness.  Both epididymis is normal.  Both spermatic cords normal in both vasa easily palpable  Assessment: Elective sterilization  Plan: Bilateral vasectomy under local.  Semen analysis 3 months postop.  Stop multivitamin, fish oil and vitamin E 10 days before procedure    Again, thank you for allowing me to participate in the care of your patient.      Sincerely,    Carlos Blanton MD

## 2019-05-08 NOTE — PROGRESS NOTES
Scar is a pleasant 39-year-old male who is  with 2 children and wishes to be sterilized.  He is read the article on vasectomy and this was re-reviewed with the patient.  The procedure, the alternatives, risks and follow-up were carefully discussed  Other past medical history: History of depression, anxiety, alcohol dependence, substance abuse, non-smoker  Medications: Wellbutrin XL, EpiPen, Lexapro, Neurontin, multivitamin, fish oil, Cialis, vitamin D  Allergies: Peanuts  Review of systems: Negative  Exam: Alert and oriented, normal appearance, normal vitals.  Normal respirations, neuro grossly intact.  Normal phallus with normal urethral meatus glands.  Normal scrotum.  Both testicles descended without masses or tenderness.  Both epididymis is normal.  Both spermatic cords normal in both vasa easily palpable  Assessment: Elective sterilization  Plan: Bilateral vasectomy under local.  Semen analysis 3 months postop.  Stop multivitamin, fish oil and vitamin E 10 days before procedure

## 2019-05-24 ENCOUNTER — OFFICE VISIT (OUTPATIENT)
Dept: UROLOGY | Facility: CLINIC | Age: 39
End: 2019-05-24
Payer: COMMERCIAL

## 2019-05-24 VITALS
OXYGEN SATURATION: 98 % | HEART RATE: 69 BPM | HEIGHT: 74 IN | BODY MASS INDEX: 26.82 KG/M2 | DIASTOLIC BLOOD PRESSURE: 76 MMHG | WEIGHT: 209 LBS | SYSTOLIC BLOOD PRESSURE: 124 MMHG

## 2019-05-24 DIAGNOSIS — Z30.2 ENCOUNTER FOR STERILIZATION: Primary | ICD-10-CM

## 2019-05-24 PROCEDURE — 88302 TISSUE EXAM BY PATHOLOGIST: CPT | Performed by: UROLOGY

## 2019-05-24 PROCEDURE — 55250 REMOVAL OF SPERM DUCT(S): CPT | Performed by: UROLOGY

## 2019-05-24 RX ORDER — LIDOCAINE HYDROCHLORIDE 20 MG/ML
20 INJECTION, SOLUTION INFILTRATION; PERINEURAL ONCE
Status: DISCONTINUED | OUTPATIENT
Start: 2019-05-24 | End: 2019-05-24 | Stop reason: HOSPADM

## 2019-05-24 RX ORDER — CIPROFLOXACIN 500 MG/1
500 TABLET, FILM COATED ORAL EVERY 12 HOURS
Qty: 3 TABLET | Refills: 0 | Status: SHIPPED | OUTPATIENT
Start: 2019-05-24 | End: 2020-03-19

## 2019-05-24 ASSESSMENT — PAIN SCALES - GENERAL
PAINLEVEL: NO PAIN (0)
PAINLEVEL: NO PAIN (0)

## 2019-05-24 ASSESSMENT — MIFFLIN-ST. JEOR: SCORE: 1919.83

## 2019-05-24 NOTE — NURSING NOTE
Chief Complaint   Patient presents with     Sterilization     Pt here for in office vasectomy     The patient comes in today for a vasectomy.  The patient and his significant other have previously been in and we discussed the other options for birth control, the risks and benefits of the procedure.  Details of those risks and benefits have been noted in the consent form which has been signed.  This includes the potential for bleeding, potential for infection.  Data presented suggesting an increased risk of prostate carcinoma and potential for sperm granuloma formation.    The patient was placed in a supine position on the procedure table.  Prep was done by the patient at home.  Sterilely prepped and draped in the usual fashion.  The left vas was first identified and brought up to the midline raphae where a small wheal of Lidocaine with epinephrine was placed in the skin overlying the vas and further anesthesia was injected in to the vas sheath.  The no-scalpel dissecting instrument was then used to puncture the skin and dissect out the vas free of adventitial tissue.  The no-scalpel vas clamp was then used to grasp the vas.  Skin bleeders were cauterized with electrocautery as necessary.  When a segment of vas was clearly freed up, a .5-1 cm. segment of the vas was then removed and the proximal and distal ends were ligated with 4-0 Vicryl.  The distal end was then buried with pursestring suture.  Any bleeders were then cauterized and/or ligated to control hemorrhage.  When the sterile field was completely dry, it was returned to the scrotal sac.      Attention was then turned to the opposite side and proceeded in similar fashion to obtain specimen.  The vas was brought up to the same opening, injected with Lidocaine along the vas sheath and grasped with the clamp, and proceeded in the above fashion.  A 3-0 Chromic suture was used for the scrotal skin incision and a single interrupted suture was used to  re-approximate the skin edges.    ASSESSMENT:  1)  Male sterilization via vasectomy.     PLAN:  Patient tolerated the procedure quite well.  He will use Ibuprofen 800 mg. p.o. t.i.d. with food.  Ice to the scrotum 30 minutes at a time every two to three hours for the next two to three days.  No heavy lifting for three days.  The patient will bring in a sample of semen for analysis after 10-12 ejaculations.    The following medication was given:     MEDICATION:  Lidocaine 2% Soln  ROUTE: vas deferens  SITE: vas deferens  DOSE: 20ml  LOT #: [id number]  : [employer]  EXPIRATION DATE: 09/2020  NDC#: 8276-9916-47   Was there drug waste? Yes  Amount of drug waste (mL): 30ml.  Reason for waste:  not needed  Multi-dose vial: No    Debbie Joseph CMA  May 24, 2019  Debbie Joseph CMA

## 2019-05-24 NOTE — PROGRESS NOTES
Diagnosis: Elective sterilization  Procedure: Bilateral vasectomy  Surgeon: Harvinder  Anesthesia: Local  EBL: Less than 5 ml  Complications: None.  Patient tolerated procedure well  Follow-up: Cipro 500 mg every 12 hours x 3 doses.  Ice pack hourly tonight for 15 minutes.  Physical restrictions discussed.  Follow-up semen analysis in 3 months

## 2019-05-24 NOTE — PATIENT INSTRUCTIONS
POST VASECTOMY INSTRUCTIONS    1.) If you have any concerns or questions, please contact our office at [phone].     2.) It is okay to take a shower, however, do not soak in water (bath,swimming, hot tub,etc....) until your incision is healed.    3.) You might notice some swelling, mild bruising, and discomfort for several days after your vasectomy. This is to be expected. For at least the next 24 hours, an ice pack should be applied for 20 minutes every hour that you are awake. Ice will help with discomfort and swelling. Do not place directly on the skin.    4.) No intercourse, strenuous activity or exercise for at least 7-10 days, even if you feel fine.    5.) You need to wear good scrotal support while you are healing. We strongly recommend an athletic supporter or a pair of regular briefs that are one size too small. Boxer briefs do not offer enough support.    6.) Tylenol as directed on the bottle is preferred for discomfort. Please avoid any blood thinning products such as ibuprofen and aspirin (Motrin, Advil, Excedrin, Aleve, ect..) for at least the next week.    7.) It is normal to have mild drainage from the incision area for several days. However, please contact our office if you notice: bright red blood that does not stop after three days, increased pain, heat at the incision, red streaks, foul smelling discharge, or if you start to run a fever.     8.) YOU MUST CONTINUE BIRTH CONTROL UNTIL WE CONFIRM YOUR STERILITY.  This process can take up to a year to complete (rare occurrence).     9.) You have been given a form with specimen cup and instructions for your follow up specimen. You will be cleared once we receive ONE negative specimen. If your specimen comes back positive (sperm seen) you will be asked to repeat the test. This does not mean that your vasectomy has failed.

## 2019-05-24 NOTE — LETTER
5/24/2019     RE: Scar Cheng  [address]  [facility] [address]     Dear Colleague,    Thank you for referring your patient, Scar Cheng, to the [facility] [place] at [facility]. Please see a copy of my visit note below.    Diagnosis: Elective sterilization  Procedure: Bilateral vasectomy  Surgeon: Harvinder  Anesthesia: Local  EBL: Less than 5 ml  Complications: None.  Patient tolerated procedure well  Follow-up: Cipro 500 mg every 12 hours x 3 doses.  Ice pack hourly tonight for 15 minutes.  Physical restrictions discussed.  Follow-up semen analysis in 3 months    Again, thank you for allowing me to participate in the care of your patient.      Sincerely,    Carlos Blanton MD

## 2019-05-29 LAB — COPATH REPORT: NORMAL

## 2019-09-04 DIAGNOSIS — F33.0 MAJOR DEPRESSIVE DISORDER, RECURRENT EPISODE, MILD (H): ICD-10-CM

## 2019-09-04 RX ORDER — BUPROPION HYDROCHLORIDE 300 MG/1
300 TABLET ORAL EVERY MORNING
Qty: 30 TABLET | Refills: 0 | Status: SHIPPED | OUTPATIENT
Start: 2019-09-04 | End: 2019-09-19

## 2019-09-04 NOTE — TELEPHONE ENCOUNTER
Medication requested: buPROPion (WELLBUTRIN XL) 300 MG 24 hr tablet  Last refilled: 6/8/19  Qty: 90      Last seen: 3/7/19  RTC: 6 months  Cancel: 0  No-show: 0  Next appt: 9/19/19    Refill decision:   Refilled for 30 days per protocol.

## 2019-09-10 NOTE — TELEPHONE ENCOUNTER
Medication requested: Gabapentin 300 mg caps  Last refilled: 4-10-17  Qty: 540      Last seen: 1-18-17  RTC: 6 mo  Cancel: 0  No-show: 0  Next appt: 7-19-17    Refill decision: Refilled for 30 days per protocol.      Kathleen M Doege RN       Interpolation Flap Text: A decision was made to reconstruct the defect utilizing an interpolation axial flap and a staged reconstruction.  A telfa template was made of the defect.  This telfa template was then used to outline the interpolation flap.  The donor area for the pedicle flap was then injected with anesthesia.  The flap was excised through the skin and subcutaneous tissue down to the layer of the underlying musculature.  The interpolation flap was carefully excised within this deep plane to maintain its blood supply.  The edges of the donor site were undermined.   The donor site was closed in a primary fashion.  The pedicle was then rotated into position and sutured.  Once the tube was sutured into place, adequate blood supply was confirmed with blanching and refill.  The pedicle was then wrapped with xeroform gauze and dressed appropriately with a telfa and gauze bandage to ensure continued blood supply and protect the attached pedicle.

## 2019-09-19 ENCOUNTER — OFFICE VISIT (OUTPATIENT)
Dept: PSYCHIATRY | Facility: CLINIC | Age: 39
End: 2019-09-19
Attending: CLINICAL NURSE SPECIALIST
Payer: COMMERCIAL

## 2019-09-19 VITALS
DIASTOLIC BLOOD PRESSURE: 77 MMHG | SYSTOLIC BLOOD PRESSURE: 119 MMHG | HEART RATE: 76 BPM | BODY MASS INDEX: 26.76 KG/M2 | WEIGHT: 205.6 LBS

## 2019-09-19 DIAGNOSIS — F10.21 ALCOHOL DEPENDENCE IN REMISSION (H): ICD-10-CM

## 2019-09-19 DIAGNOSIS — F33.0 MAJOR DEPRESSIVE DISORDER, RECURRENT EPISODE, MILD (H): Primary | ICD-10-CM

## 2019-09-19 DIAGNOSIS — F41.9 ANXIETY: ICD-10-CM

## 2019-09-19 PROCEDURE — G0463 HOSPITAL OUTPT CLINIC VISIT: HCPCS | Mod: ZF

## 2019-09-19 RX ORDER — BUPROPION HYDROCHLORIDE 300 MG/1
300 TABLET ORAL EVERY MORNING
Qty: 90 TABLET | Refills: 1 | Status: SHIPPED | OUTPATIENT
Start: 2019-09-19 | End: 2020-03-19

## 2019-09-19 RX ORDER — GABAPENTIN 300 MG/1
600 CAPSULE ORAL 3 TIMES DAILY
Qty: 540 CAPSULE | Refills: 1 | Status: SHIPPED | OUTPATIENT
Start: 2019-09-19 | End: 2020-03-19

## 2019-09-19 RX ORDER — ESCITALOPRAM OXALATE 20 MG/1
30 TABLET ORAL DAILY
Qty: 135 TABLET | Refills: 1 | Status: SHIPPED | OUTPATIENT
Start: 2019-09-19 | End: 2020-03-19

## 2019-09-19 ASSESSMENT — PAIN SCALES - GENERAL: PAINLEVEL: NO PAIN (0)

## 2019-09-19 NOTE — PATIENT INSTRUCTIONS
Thank you for coming to the PSYCHIATRY CLINIC.    Lab Testing:  If you had lab testing today and your results are reassuring or normal they will be mailed to you or sent through [facility] within 7 days.   If the lab tests need quick action we will call you with the results.  The phone number we will call with results is # [phone] (home) . If this is not the best number please call our clinic and change the number.    Medication Refills:  If you need any refills please call your pharmacy and they will contact us. Our fax number for refills is [phone]. Please allow three business for refill processing.   If you need to  your refill at a new pharmacy, please contact the new pharmacy directly. The new pharmacy will help you get your medications transferred.     Scheduling:  If you have any concerns about today's visit or wish to schedule another appointment please call our office during normal business hours [phone] (8-5:00 M-F)    Contact Us:  Please call [phone] during business hours (8-5:00 M-F).  If after clinic hours, or on the weekend, please call  [phone].    Financial Assistance [phone]  [facility] Billing [phone]  Central Billing Office, [facility]: [phone]  [place] Billing [phone]  Medical Records [phone]      MENTAL HEALTH CRISIS NUMBERS:  [facility]:   [facility] - [phone]   Crisis Residence [facility] Residence - [phone]   Walk-In Counseling [facility] [phone]   COPE 24/7 [place] Mobile Team for Adults - [[phone]]; Child - [[phone]]        [facility]:   [facility] - [phone]   Walk-in counseling [facility] - [phone]   Walk-in counseling [facility] - [phone]   Crisis Residence [facility] - [phone]   Urgent Care Adult Mental Health:   --Drop-in, 24/7 crisis line, and [place] Mobile Team  [[phone]]    CRISIS TEXT LINE: Text [phone] from anywhere, anytime, any crisis 24/7;    OR SEE www.crisistextline.org     Poison Control Center - [phone]    CHILD: [facility] needs assessment team - [phone]     [facility] - [phone]; or [facility] Lif[facility] - [phone]    If you have a medical emergency please call 911or go to the nearest ER.                    _____________________________________________    Again thank you for choosing PSYCHIATRY CLINIC and please let us know how we can best partner with you to improve you and your family's health.  You may be receiving a survey in the mail regarding this appointment. We would love to have your feedback, both positive and negative, so please fill out the survey and return it using the provided envelope. The survey is done by an external company, so your answers are anonymous.

## 2019-09-19 NOTE — PROGRESS NOTES
Outpatient Psychiatry Progress Note     Provider: ADI Salvador CNS  Date: 2019  Service:  Medication follow up with counseling.   Patient Identification: Scar Cheng  : 1980   MRN: [id number]    Scar Cheng is a 39 year old year old male who presents for ongoing psychiatric care.  Scar Cheng was last seen in clinic on 3/7/19.   At that time,   Assessment & Plan       Scar Cheng is seen today for follow up and reports he is stressed by not being employed since the week before Daphnie due to a falling out with his . Second child was born in January and is glad to be home to help with children.  Continues to be sober and mood has been stable except for situational worry     Diagnosis       Encounter Diagnoses   Name Primary?     Anxiety       Major depressive disorder, recurrent episode, mild (H)       Alcohol dependence in remission (H)           Plan:  Medication: Continue current medication  OTC Recommendations: none  Lab Orders:  none  Referrals: none  Release of Information: none  Future Treatment Considerations:per symptoms  Return for Follow Up:6 months      ____________________________________________________________________________________________________________________________________________    2019  Today Scar reports he is feeling well. Youngest son is now 8 months old. Has found a new job and been there 3 weeks. Stress with going that long without a job. 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  Has continued to be sober.   Side effects of medication include: none reported  Psychiatric Review of Systems:  Depression: In the last 2 weeks per PHQ-9 score:   PHQ-9 SCORE 2019   PHQ-9 Total Score -   PHQ-9 Total Score 2       Anxiety : stable  Luh na   Psychosis  na.   ADHD na    Review of Medical Systems:  Sleep: mild  Energy: mild  Concentration: stable  Appetite: stable  GI Concerns:  none  Cardiac concerns: none  Neurological concerns: none   Other medical concerns: no new concerns  Current Substance Use:  Alcohol:continued sober  Other drugs:denies  Caffeine:no change  Nicotine: none  Past Medical History:   Past Medical History:   Diagnosis Date     Alcohol dependence (H) 8/17/2012     Anxiety      Depressive disorder      Substance abuse (H)      Patient Active Problem List   Diagnosis     Family history of colon cancer     CARDIOVASCULAR SCREENING; LDL GOAL LESS THAN 160     Major depressive disorder, recurrent episode, mild (H)     Anxiety       Allergies:   Allergies   Allergen Reactions     Peanuts [Nuts] Anaphylaxis          Current Medications     Current Outpatient Medications Ordered in Epic   Medication Sig Dispense Refill     buPROPion (WELLBUTRIN XL) 300 MG 24 hr tablet Take 1 tablet (300 mg) by mouth every morning 30 tablet 0     EPINEPHrine (EPIPEN) 0.3 MG/0.3ML injection Inject 0.3 mLs (0.3 mg) into the muscle once as needed for anaphylaxis 2 each 1     escitalopram (LEXAPRO) 20 MG tablet Take 1.5 tablets (30 mg) by mouth daily 135 tablet 1     gabapentin (NEURONTIN) 300 MG capsule Take 2 capsules (600 mg) by mouth 3 times daily 540 capsule 1     Omega-3 Fatty Acids (OMEGA-3 FISH OIL PO)        Vitamin Mixture (VITAMIN E COMPLETE PO)        ciprofloxacin (CIPRO) 500 MG tablet Take 1 tablet (500 mg) by mouth every 12 hours (Patient not taking: Reported on 9/19/2019) 3 tablet 0     Multiple Vitamins-Minerals (MULTIVITAMIN PO)        Tadalafil (CIALIS) 2.5 MG TABS Take 2.5 mg by mouth daily Never use with nitroglycerin, terazosin or doxazosin. (Patient not taking: Reported on 5/8/2019) 30 tablet 1     No current Epic-ordered facility-administered medications on file.         Mental Status Exam     Appearance:  Casually dressed and Well groomed  Behavior/relationship to examiner/demeanor:  Cooperative  Orientation: Oriented to person, place, time and situation  Psychomotor: normal  "form  Speech Rate:  Normal  Speech Spontaneity:  Normal  Mood:  \"okay\"  Affect:  Appropriate/mood-congruent  Thought Process (Associations):  Goal directed  Thought Content:  no overt psychosis, denies suicidal ideation, intent or thoughts and patient does not appear to be responding to internal stimuli  Abnormal Perception:  None  Attention/Concentration:  Normal  Insight:  Good  Judgment:  Good      Results     Vital signs: /77   Pulse 76   Wt 93.3 kg (205 lb 9.6 oz)   BMI 26.76 kg/m      Laboratory Data:  no new data    Assessment & Plan      Scar Cheng is seen today for follow up and reports his mood has been stable.Stress with period of unemployment but managed well.    Diagnosis  Encounter Diagnoses   Name Primary?     Major depressive disorder, recurrent episode, mild (H) Yes     Anxiety      Alcohol dependence in remission (H)        Plan:  Medication: Continue current medications  OTC Recommendations: none  Lab Orders:  none  Referrals: none  Release of Information: none  Future Treatment Considerations:per symptoms  Return for Follow Up: 6 months   The risks, benefits, alternatives and side effects have been discussed and are understood by the patient. The patient understands the risks of using street drugs or alcohol. There are no medical contraindications, the patient agrees to treatment, and has the capacity to do so. The patient understands to call 911 or come to the nearest ED if life threatening or urgent symptoms present.  In addition time was spent counseling the patient and/or coordinating care regarding review of social and occupational functioning.  In addition patient was counseled on health and wellness practices to augment medication treatment of symptoms. See note for details.    Radha Burdick, APRN CNS 9/19/2019   "

## 2019-10-13 ASSESSMENT — PATIENT HEALTH QUESTIONNAIRE - PHQ9: SUM OF ALL RESPONSES TO PHQ QUESTIONS 1-9: 2

## 2019-12-14 ENCOUNTER — TRANSFERRED RECORDS (OUTPATIENT)
Dept: HEALTH INFORMATION MANAGEMENT | Facility: CLINIC | Age: 39
End: 2019-12-14

## 2020-03-01 ENCOUNTER — HEALTH MAINTENANCE LETTER (OUTPATIENT)
Age: 40
End: 2020-03-01

## 2020-03-19 ENCOUNTER — OFFICE VISIT (OUTPATIENT)
Dept: PSYCHIATRY | Facility: CLINIC | Age: 40
End: 2020-03-19
Attending: CLINICAL NURSE SPECIALIST
Payer: COMMERCIAL

## 2020-03-19 DIAGNOSIS — F10.21 ALCOHOL DEPENDENCE IN REMISSION (H): ICD-10-CM

## 2020-03-19 DIAGNOSIS — F41.9 ANXIETY: ICD-10-CM

## 2020-03-19 DIAGNOSIS — F33.0 MAJOR DEPRESSIVE DISORDER, RECURRENT EPISODE, MILD (H): ICD-10-CM

## 2020-03-19 RX ORDER — GABAPENTIN 300 MG/1
600 CAPSULE ORAL 3 TIMES DAILY
Qty: 540 CAPSULE | Refills: 1 | Status: SHIPPED | OUTPATIENT
Start: 2020-03-19

## 2020-03-19 RX ORDER — ESCITALOPRAM OXALATE 20 MG/1
30 TABLET ORAL DAILY
Qty: 135 TABLET | Refills: 1 | Status: SHIPPED | OUTPATIENT
Start: 2020-03-19

## 2020-03-19 RX ORDER — BUPROPION HYDROCHLORIDE 300 MG/1
300 TABLET ORAL EVERY MORNING
Qty: 90 TABLET | Refills: 1 | Status: SHIPPED | OUTPATIENT
Start: 2020-03-19 | End: 2020-10-13

## 2020-03-19 NOTE — PROGRESS NOTES
"  Outpatient Psychiatry Progress Note     Provider: ADI Salvador CNS  Date: 3/19/2020  Service:  Medication follow up with counseling by telephone  Patient Identification: Sacr Cheng  : 1980   MRN: [id number]    Scar Cheng is a 39 year old year old male who presents for ongoing psychiatric care.  Scar Cheng was last seen in clinic on 20.   At that time,   Assessment & Plan       Scar Cheng is seen today for follow up and reports his mood has been stable.Stress with period of unemployment but managed well.     Diagnosis       Encounter Diagnoses   Name Primary?     Major depressive disorder, recurrent episode, mild (H) Yes     Anxiety       Alcohol dependence in remission (H)           Plan:  Medication: Continue current medications  OTC Recommendations: none  Lab Orders:  none  Referrals: none  Release of Information: none  Future Treatment Considerations:per symptoms  Return for Follow Up: 6 months      ____________________________________________________________________________________________________________________________________________    Scar Cheng is a 39 year old male who is being evaluated via a billable telephone visit.      The patient has been notified of following:     \"This telephone visit will be conducted via a call between you and your physician/provider. We have found that certain health care needs can be provided without the need for a physical exam.  This service lets us provide the care you need with a short phone conversation.  If a prescription is necessary we can send it directly to your pharmacy.  If lab work is needed we can place an order for that and you can then stop by our lab to have the test done at a later time.    Telephone visits are billed at different rates depending on your insurance coverage. During this emergency period, for some insurers they may be billed the same as an in-person visit.  Please reach out to your insurance " "provider with any questions.    If during the course of the call the physician/provider feels a telephone visit is not appropriate, you will not be charged for this service.\"    Patient has given verbal consent for Telephone visit?  Yes      03/19/2020   Start 8:56 am, End time 9:06 am Total time: 10 minutes  Today Scar reports his mood has been stable and not needing medication changes.  Managing changes with COVID 19  Side effects of medication include: none  Psychiatric Review of Systems:  Depression: In the last 2 weeks per PHQ-9 score:   PHQ-9 SCORE 9/7/2018 3/7/2019 9/19/2019   PHQ-9 Total Score - - -   PHQ-9 Total Score 2 5 2        Anxiety : stable  Luh na   Psychosis  na.   ADHD na    Review of Medical Systems:  Sleep: stable  Energy: stable  Concentration: stable  Appetite: stable  GI Concerns: none  Cardiac concerns: none  Neurological concerns: none  Other medical concerns: no new concerns  Current Substance Use:  Alcohol:continues stable  Other drugs:none  Caffeine:not reviewed  Nicotine: none  Past Medical History:   Past Medical History:   Diagnosis Date     Alcohol dependence (H) 8/17/2012     Anxiety      Depressive disorder      Substance abuse (H)      Patient Active Problem List   Diagnosis     Family history of colon cancer     CARDIOVASCULAR SCREENING; LDL GOAL LESS THAN 160     Major depressive disorder, recurrent episode, mild (H)     Anxiety       Allergies:   Allergies   Allergen Reactions     Peanuts [Nuts] Anaphylaxis          Current Medications     Current Outpatient Medications Ordered in Epic   Medication Sig Dispense Refill     buPROPion (WELLBUTRIN XL) 300 MG 24 hr tablet Take 1 tablet (300 mg) by mouth every morning 90 tablet 1     ciprofloxacin (CIPRO) 500 MG tablet Take 1 tablet (500 mg) by mouth every 12 hours (Patient not taking: Reported on 9/19/2019) 3 tablet 0     EPINEPHrine (EPIPEN) 0.3 MG/0.3ML injection Inject 0.3 mLs (0.3 mg) into the muscle once as needed for " "anaphylaxis 2 each 1     escitalopram (LEXAPRO) 20 MG tablet Take 1.5 tablets (30 mg) by mouth daily 135 tablet 1     gabapentin (NEURONTIN) 300 MG capsule Take 2 capsules (600 mg) by mouth 3 times daily 540 capsule 1     Multiple Vitamins-Minerals (MULTIVITAMIN PO)        Omega-3 Fatty Acids (OMEGA-3 FISH OIL PO)        Tadalafil (CIALIS) 2.5 MG TABS Take 2.5 mg by mouth daily Never use with nitroglycerin, terazosin or doxazosin. (Patient not taking: Reported on 5/8/2019) 30 tablet 1     Vitamin Mixture (VITAMIN E COMPLETE PO)        No current Epic-ordered facility-administered medications on file.         Mental Status Exam     Behavior/relationship to examiner/demeanor:  Cooperative  Orientation: Oriented to person, place, time and situation  Speech Rate:  Normal  Speech Spontaneity:  Normal  Mood:  \"good\"  Affect:  Appropriate/mood-congruent  Thought Process (Associations):  Goal directed  Thought Content:  no overt psychosis, denies suicidal ideation, intent or thoughts and patient does not appear to be responding to internal stimuli  Abnormal Perception:  None  Attention/Concentration:  Normal  Insight:  Good  Judgment:  Good      Results     Vital signs: There were no vitals taken for this visit.    Laboratory Data:  no new data    Assessment & Plan      Scar Cheng is interviewed by phone today for follow up and reports his mood has been stable and would like to continue current medications.    Diagnosis  Encounter Diagnoses   Name Primary?     Major depressive disorder, recurrent episode, mild (H)      Anxiety      Alcohol dependence in remission (H)        Plan:  Medication: Continue current medications  OTC Recommendations: none  Lab Orders:  none  Referrals: none  Release of Information: none  Future Treatment Considerations:per symptoms  Return for Follow Up: 4 to 6 months at [facility]   The risks, benefits, alternatives and side effects have been discussed and are understood by the patient. The patient " understands the risks of using street drugs or alcohol. There are no medical contraindications, the patient agrees to treatment, and has the capacity to do so. The patient understands to call 911 or come to the nearest ED if life threatening or urgent symptoms present.  In addition time was spent counseling the patient and/or coordinating care regarding review of social and occupational functioning.  In addition patient was counseled on health and wellness practices to augment medication treatment of symptoms. See note for details.    Radha Burdick, APRN CNS 3/19/2020

## 2020-10-09 DIAGNOSIS — F33.0 MAJOR DEPRESSIVE DISORDER, RECURRENT EPISODE, MILD (H): ICD-10-CM

## 2020-10-12 ENCOUNTER — TELEPHONE (OUTPATIENT)
Dept: PSYCHIATRY | Facility: CLINIC | Age: 40
End: 2020-10-12

## 2020-10-12 NOTE — TELEPHONE ENCOUNTER
--Radha Burdick is no longer a provider at the [facility].  --Message sent to provider to determine how she would like to address the refill request.  --Per Radha Burdick, in order for her to refill the pt's medications, pt will need to complete an JACQUI so that records can be released to [facility]. The prescriptions will also need to be forwarded to the provider at [facility] directly from the pharmacy.  --Returned phone call to pt. LVM relaying information from provider. Instructed him to return phone call to clinic regarding completion of an JACQUI.

## 2020-10-12 NOTE — TELEPHONE ENCOUNTER
[facility] Call Center    Phone Message    May a detailed message be left on voicemail: yes     Reason for Call: Medication Refill Request    Has the patient contacted the pharmacy for the refill? Yes   Name of medication being requested: citalopram and buproprion  Provider who prescribed the medication: Radha Burdick  Pharmacy: [facility] in [facility] in [place]  Date medication is needed: Pt has a few days left. He has an appt scheduled with Radha at [facility] but it's in 3 weeks and was instructed to call the clinic to help with refills while he's waiting to see her.        Action Taken: Other: [place] [employer] pool    Travel Screening: Not Applicable

## 2020-10-13 RX ORDER — BUPROPION HYDROCHLORIDE 300 MG/1
300 TABLET ORAL EVERY MORNING
Qty: 30 TABLET | Refills: 0 | Status: SHIPPED | OUTPATIENT
Start: 2020-10-13

## 2020-10-13 NOTE — TELEPHONE ENCOUNTER
buPROPion (WELLBUTRIN XL) 300 MG  Last refilled: 3/19/20  Qty: 90:1    Last seen: 3/19/20  RTC: 6  MOS  Cancel: 0  No-show: 0  Next appt: NONE  Scheduling has been notified to contact the pt for appointment.  Refill decision: 30 day alba refill sent to the pharmacy - including instructions for patient to call the clinic and schedule an appointment.

## 2020-10-14 DIAGNOSIS — F33.0 MAJOR DEPRESSIVE DISORDER, RECURRENT EPISODE, MILD (H): ICD-10-CM

## 2020-10-14 DIAGNOSIS — F41.9 ANXIETY: ICD-10-CM

## 2020-10-16 DIAGNOSIS — F41.9 ANXIETY: ICD-10-CM

## 2020-10-16 DIAGNOSIS — F33.0 MAJOR DEPRESSIVE DISORDER, RECURRENT EPISODE, MILD (H): ICD-10-CM

## 2020-10-16 RX ORDER — ESCITALOPRAM OXALATE 20 MG/1
TABLET ORAL
Qty: 135 TABLET | Refills: 1 | OUTPATIENT
Start: 2020-10-16

## 2020-10-16 NOTE — TELEPHONE ENCOUNTER
Refill denied   provider no longer at the [facility]  now at [facility]..  Pharmacy was called and notified and new fax given..

## 2020-10-19 DIAGNOSIS — F41.9 ANXIETY: ICD-10-CM

## 2020-10-19 DIAGNOSIS — F33.0 MAJOR DEPRESSIVE DISORDER, RECURRENT EPISODE, MILD (H): ICD-10-CM

## 2020-10-19 RX ORDER — ESCITALOPRAM OXALATE 20 MG/1
TABLET ORAL
Qty: 135 TABLET | Refills: 1 | OUTPATIENT
Start: 2020-10-19

## 2020-10-19 NOTE — TELEPHONE ENCOUNTER
seeing provider @ [facility]3] LOCATION * PT CALL TO FOLLOW UP THERE

## 2020-10-22 RX ORDER — ESCITALOPRAM OXALATE 20 MG/1
TABLET ORAL
Qty: 135 TABLET | Refills: 1 | OUTPATIENT
Start: 2020-10-22

## 2020-12-14 ENCOUNTER — HEALTH MAINTENANCE LETTER (OUTPATIENT)
Age: 40
End: 2020-12-14

## 2021-04-17 ENCOUNTER — HEALTH MAINTENANCE LETTER (OUTPATIENT)
Age: 41
End: 2021-04-17

## 2021-06-21 ENCOUNTER — OFFICE VISIT (OUTPATIENT)
Dept: FAMILY MEDICINE | Facility: CLINIC | Age: 41
End: 2021-06-21
Payer: COMMERCIAL

## 2021-06-21 VITALS
HEIGHT: 74 IN | BODY MASS INDEX: 27.59 KG/M2 | DIASTOLIC BLOOD PRESSURE: 70 MMHG | WEIGHT: 215 LBS | TEMPERATURE: 96.4 F | OXYGEN SATURATION: 96 % | SYSTOLIC BLOOD PRESSURE: 112 MMHG | HEART RATE: 91 BPM

## 2021-06-21 DIAGNOSIS — Z13.220 SCREENING FOR HYPERLIPIDEMIA: ICD-10-CM

## 2021-06-21 DIAGNOSIS — F33.0 MAJOR DEPRESSIVE DISORDER, RECURRENT EPISODE, MILD (H): ICD-10-CM

## 2021-06-21 DIAGNOSIS — Z12.11 COLON CANCER SCREENING: ICD-10-CM

## 2021-06-21 DIAGNOSIS — D72.829 LEUKOCYTOSIS, UNSPECIFIED TYPE: ICD-10-CM

## 2021-06-21 DIAGNOSIS — Z91.010 ALLERGY HISTORY, PEANUTS: ICD-10-CM

## 2021-06-21 DIAGNOSIS — T88.7XXA MEDICATION SIDE EFFECTS: ICD-10-CM

## 2021-06-21 DIAGNOSIS — Z00.00 ROUTINE HISTORY AND PHYSICAL EXAMINATION OF ADULT: Primary | ICD-10-CM

## 2021-06-21 LAB
ERYTHROCYTE [DISTWIDTH] IN BLOOD BY AUTOMATED COUNT: 12.3 % (ref 10–15)
HCT VFR BLD AUTO: 42.8 % (ref 40–53)
HGB BLD-MCNC: 14.9 G/DL (ref 13.3–17.7)
MCH RBC QN AUTO: 32 PG (ref 26.5–33)
MCHC RBC AUTO-ENTMCNC: 34.8 G/DL (ref 31.5–36.5)
MCV RBC AUTO: 92 FL (ref 78–100)
PLATELET # BLD AUTO: 320 10E9/L (ref 150–450)
RBC # BLD AUTO: 4.66 10E12/L (ref 4.4–5.9)
WBC # BLD AUTO: 13.7 10E9/L (ref 4–11)

## 2021-06-21 PROCEDURE — 85004 AUTOMATED DIFF WBC COUNT: CPT | Performed by: INTERNAL MEDICINE

## 2021-06-21 PROCEDURE — 36415 COLL VENOUS BLD VENIPUNCTURE: CPT | Performed by: INTERNAL MEDICINE

## 2021-06-21 PROCEDURE — 99386 PREV VISIT NEW AGE 40-64: CPT | Performed by: INTERNAL MEDICINE

## 2021-06-21 PROCEDURE — 99214 OFFICE O/P EST MOD 30 MIN: CPT | Mod: 25 | Performed by: INTERNAL MEDICINE

## 2021-06-21 PROCEDURE — 85027 COMPLETE CBC AUTOMATED: CPT | Performed by: INTERNAL MEDICINE

## 2021-06-21 PROCEDURE — 80061 LIPID PANEL: CPT | Performed by: INTERNAL MEDICINE

## 2021-06-21 PROCEDURE — 85048 AUTOMATED LEUKOCYTE COUNT: CPT | Performed by: INTERNAL MEDICINE

## 2021-06-21 PROCEDURE — 80053 COMPREHEN METABOLIC PANEL: CPT | Performed by: INTERNAL MEDICINE

## 2021-06-21 PROCEDURE — 93000 ELECTROCARDIOGRAM COMPLETE: CPT | Performed by: INTERNAL MEDICINE

## 2021-06-21 RX ORDER — EPINEPHRINE 0.3 MG/.3ML
0.3 INJECTION SUBCUTANEOUS
Qty: 2 EACH | Refills: 1 | Status: SHIPPED | OUTPATIENT
Start: 2021-06-21

## 2021-06-21 ASSESSMENT — PATIENT HEALTH QUESTIONNAIRE - PHQ9: SUM OF ALL RESPONSES TO PHQ QUESTIONS 1-9: 2

## 2021-06-21 ASSESSMENT — MIFFLIN-ST. JEOR: SCORE: 1937.04

## 2021-06-21 NOTE — PROGRESS NOTES
SUBJECTIVE:   CC: Scar Cheng is an 41 year old male who presents for preventative health visit.         Patient has been advised of split billing requirements and indicates understanding: Yes  Healthy Habits:     Getting at least 3 servings of Calcium per day:  Yes    Bi-annual eye exam:  Yes    Dental care twice a year:  Yes    Sleep apnea or symptoms of sleep apnea:  None    Diet:  Regular (no restrictions)    Frequency of exercise:  2-3 days/week    Duration of exercise:  30-45 minutes    Taking medications regularly:  Yes    Medication side effects:  Not applicable    PHQ-2 Total Score: 0    Additional concerns today:  No          Today's PHQ-2 Score:   PHQ-2 ( 1999 [employer]) 6/14/2021   Q1: Little interest or pleasure in doing things 0   Q2: Feeling down, depressed or hopeless 1   PHQ-2 Score 1   Q1: Little interest or pleasure in doing things Not at all   Q2: Feeling down, depressed or hopeless Not at all   PHQ-2 Score 0       Abuse: Current or Past(Physical, Sexual or Emotional)- No  Do you feel safe in your environment? Yes    Have you ever done Advance Care Planning? (For example, a Health Directive, POLST, or a discussion with a medical provider or your loved ones about your wishes): No, advance care planning information given to patient to review.  Patient plans to discuss their wishes with loved ones or provider.      Social History     Tobacco Use     Smoking status: Never Smoker     Smokeless tobacco: Never Used   Substance Use Topics     Alcohol use: No     Comment: daily vodka     If you drink alcohol do you typically have >3 drinks per day or >7 drinks per week? No    Alcohol Use 6/14/2021   Prescreen: >3 drinks/day or >7 drinks/week? Not Applicable   Prescreen: >3 drinks/day or >7 drinks/week? -   No flowsheet data found.    Last PSA: No results found for: PSA    Reviewed orders with patient. Reviewed health maintenance and updated orders accordingly - Yes  Lab work is in process  Labs reviewed  "in EPIC  BP Readings from Last 3 Encounters:   06/21/21 112/70   09/19/19 119/77   05/24/19 124/76    Wt Readings from Last 3 Encounters:   06/21/21 97.5 kg (215 lb)   09/19/19 93.3 kg (205 lb 9.6 oz)   05/24/19 94.8 kg (209 lb)                  Patient Active Problem List   Diagnosis     Family history of colon cancer     CARDIOVASCULAR SCREENING; LDL GOAL LESS THAN 160     Major depressive disorder, recurrent episode, mild (H)     Anxiety     Past Surgical History:   Procedure Laterality Date     COLONOSCOPY  07/2014    repeat 5 yrs     KNEE SURGERY       ORTHOPEDIC SURGERY      hand ';boxers fracture, \"       Social History     Tobacco Use     Smoking status: Never Smoker     Smokeless tobacco: Never Used   Substance Use Topics     Alcohol use: No     Comment: daily vodka     Family History   Problem Relation Age of Onset     Depression Mother      Anxiety Disorder Mother      Substance Abuse Mother      Anxiety Disorder Father      Colon Polyps Father         in his 50's     Cancer Maternal Grandfather         Lung     Cancer - colorectal Paternal Grandmother         in late 60's     Colon Cancer Paternal Grandmother      Anxiety Disorder Sister      Colon Polyps Other         aunts son, second degree relative         Current Outpatient Medications   Medication Sig Dispense Refill     buPROPion (WELLBUTRIN XL) 300 MG 24 hr tablet Take 1 tablet (300 mg) by mouth every morning * SCHEDULE CLINIC APPT FOR REFILLS   [phone] 30 tablet 0     EPINEPHrine (ANY BX GENERIC EQUIV) 0.3 MG/0.3ML injection 2-pack Inject 0.3 mLs (0.3 mg) into the muscle once as needed for anaphylaxis 2 each 1     escitalopram (LEXAPRO) 20 MG tablet Take 1.5 tablets (30 mg) by mouth daily 135 tablet 1     gabapentin (NEURONTIN) 300 MG capsule Take 2 capsules (600 mg) by mouth 3 times daily 540 capsule 1     Multiple Vitamins-Minerals (MULTIVITAMIN PO)        Omega-3 Fatty Acids (OMEGA-3 FISH OIL PO)        Tadalafil (CIALIS) 2.5 MG TABS " "Take 2.5 mg by mouth daily Never use with nitroglycerin, terazosin or doxazosin. (Patient not taking: Reported on 5/8/2019) 30 tablet 1     Vitamin Mixture (VITAMIN E COMPLETE PO)        Allergies   Allergen Reactions     Peanuts [Nuts] Anaphylaxis     Recent Labs   Lab Test 06/21/21  1652   *   HDL 66   TRIG 83   ALT 26   CR 1.17   GFRESTIMATED 76   GFRESTBLACK 89   POTASSIUM 4.4        Reviewed and updated as needed this visit by clinical staff  Tobacco  Allergies   Problems   Surg Hx  Fam Hx          Reviewed and updated as needed this visit by Provider  Tobacco  Allergies   Problems   Surg Hx  Fam Hx         Past Medical History:   Diagnosis Date     Alcohol dependence (H) 8/17/2012     Anxiety      Depressive disorder      Substance abuse (H)       Past Surgical History:   Procedure Laterality Date     COLONOSCOPY  07/2014    repeat 5 yrs     KNEE SURGERY       ORTHOPEDIC SURGERY      hand ';boxers fracture, \"       Review of Systems  CONSTITUTIONAL: NEGATIVE for fever, chills, change in weight  INTEGUMENTARY/SKIN: NEGATIVE for worrisome rashes, moles or lesions  EYES: NEGATIVE for vision changes or irritation  ENT: NEGATIVE for ear, mouth and throat problems  RESP: NEGATIVE for significant cough or SOB  CV: NEGATIVE for chest pain, palpitations or peripheral edema  GI: NEGATIVE for nausea, abdominal pain, heartburn, or change in bowel habits   male: negative for dysuria, hematuria, decreased urinary stream, erectile dysfunction, urethral discharge  MUSCULOSKELETAL: NEGATIVE for significant arthralgias or myalgia  NEURO: NEGATIVE for weakness, dizziness or paresthesias  ENDOCRINE: NEGATIVE for temperature intolerance, skin/hair changes  HEME/ALLERGY/IMMUNE: NEGATIVE for bleeding problems  PSYCHIATRIC: NEGATIVE for changes in mood or affect, mood is stable, remains on Wellbutrin and Lexapro follows with psychiatry as well as on gabapentin for anxiety.  History of in the past of polydrug abuse " "and alcohol abuse in remission for 7 years now.  Allergic: History of anaphylaxis to peanuts.  He had seen allergy specialist in the past underwent some form of desensitization.  No available records.  Patient requesting refill on epinephrine shot injections.    OBJECTIVE:   /70   Pulse 91   Temp 96.4  F (35.8  C) (Temporal)   Ht 1.867 m (6' 1.5\")   Wt 97.5 kg (215 lb)   SpO2 96%   BMI 27.98 kg/m      Physical Exam  GENERAL: healthy, alert and no distress  EYES: Eyes grossly normal to inspection, PERRL and conjunctivae and sclerae normal  HENT: ear canals and TM's normal, nose and mouth without ulcers or lesions  NECK: no adenopathy, no asymmetry, masses, or scars and thyroid normal to palpation  RESP: lungs clear to auscultation - no rales, rhonchi or wheezes  CV: regular rate and rhythm, normal S1 S2, no S3 or S4, no murmur, click or rub, no peripheral edema and peripheral pulses strong  ABDOMEN: soft, nontender, no hepatosplenomegaly, no masses and bowel sounds normal   (male): normal male genitalia without lesions or urethral discharge, no hernia  MS: no gross musculoskeletal defects noted, no edema  SKIN: no suspicious lesions or rashes  NEURO: Normal strength and tone, mentation intact and speech normal  PSYCH: mentation appears normal, affect normal/bright  LYMPH: no cervical, supraclavicular, axillary, or inguinal adenopathy    Diagnostic Test Results:  Labs reviewed in Epic    ASSESSMENT/PLAN:   Scar was seen today for physical and establish care.    Diagnoses and all orders for this visit:    Routine history and physical examination of adult  -     Comprehensive metabolic panel (BMP + Alb, Alk Phos, ALT, AST, Total. Bili, TP)  -     CBC with platelets    Major depressive disorder, recurrent episode, mild (H)    Allergy history, peanuts  -     EPINEPHrine (ANY BX GENERIC EQUIV) 0.3 MG/0.3ML injection 2-pack; Inject 0.3 mLs (0.3 mg) into the muscle once as needed for " "anaphylaxis    Medication side effects  -     EKG 12-lead complete w/read - Clinics    Colon cancer screening  -     GASTROENTEROLOGY ADULT REF PROCEDURE ONLY; Future    Leukocytosis, unspecified type  -     WBC with Diff  -     Cancel: CRP, inflammation  -     CRP inflammation; Future    Screening for hyperlipidemia  -     REVIEW OF HEALTH MAINTENANCE PROTOCOL ORDERS  -     Lipid panel reflex to direct LDL Fasting    Patient blood pressure reading was low in the high 90s repeat was 112/70.  Counseled patient on lifestyle changes.  Offered patient referral to allergy specialist, he declined.  He requested refill of epinephrine injections he knows how to use.  Does not need education on use.  Check some basic labs CBC comprehensive panel and lipid panel screening.  We will check an EKG sure QT is not prolonged being on SSRI medications.  He does have family history of colon cancer, he did underwent colonoscopy in the past  and is requesting repeat was advised repeat in 5 years back in     Patient has been advised of split billing requirements and indicates understanding: Yes  COUNSELING:   Reviewed preventive health counseling, as reflected in patient instructions       Regular exercise       Healthy diet/nutrition       Vision screening       Hearing screening       Alcohol Use        Family planning       Safe sex practices/STD prevention       Consider Hep C screening for all patients one time for ages 18-79 years       HIV screeninx in teen years, 1x in adult years, and at intervals if high risk       Colon cancer screening       Advance Care Planning    Estimated body mass index is 27.98 kg/m  as calculated from the following:    Height as of this encounter: 1.867 m (6' 1.5\").    Weight as of this encounter: 97.5 kg (215 lb).     Weight management plan: Discussed healthy diet and exercise guidelines    He reports that he has never smoked. He has never used smokeless tobacco.      Counseling " Resources:  ATP IV Guidelines  Pooled Cohorts Equation Calculator  FRAX Risk Assessment  ICSI Preventive Guidelines  Dietary Guidelines for Americans, 2010  USDA's MyPlate  ASA Prophylaxis  Lung CA Screening    Isiah Bar MD  [facility]

## 2021-06-22 DIAGNOSIS — D72.829 LEUKOCYTOSIS, UNSPECIFIED TYPE: Primary | ICD-10-CM

## 2021-06-22 LAB
ALBUMIN SERPL-MCNC: 3.9 G/DL (ref 3.4–5)
ALP SERPL-CCNC: 86 U/L (ref 40–150)
ALT SERPL W P-5'-P-CCNC: 26 U/L (ref 0–70)
ANION GAP SERPL CALCULATED.3IONS-SCNC: 7 MMOL/L (ref 3–14)
AST SERPL W P-5'-P-CCNC: 27 U/L (ref 0–45)
BASOPHILS # BLD AUTO: 0 10E9/L (ref 0–0.2)
BASOPHILS NFR BLD AUTO: 0.2 %
BILIRUB SERPL-MCNC: 0.5 MG/DL (ref 0.2–1.3)
BUN SERPL-MCNC: 16 MG/DL (ref 7–30)
CALCIUM SERPL-MCNC: 9.6 MG/DL (ref 8.5–10.1)
CHLORIDE SERPL-SCNC: 104 MMOL/L (ref 94–109)
CHOLEST SERPL-MCNC: 217 MG/DL
CO2 SERPL-SCNC: 27 MMOL/L (ref 20–32)
CREAT SERPL-MCNC: 1.17 MG/DL (ref 0.66–1.25)
DIFFERENTIAL METHOD BLD: ABNORMAL
EOSINOPHIL # BLD AUTO: 0.5 10E9/L (ref 0–0.7)
EOSINOPHIL NFR BLD AUTO: 3.4 %
GFR SERPL CREATININE-BSD FRML MDRD: 76 ML/MIN/{1.73_M2}
GLUCOSE SERPL-MCNC: 78 MG/DL (ref 70–99)
HDLC SERPL-MCNC: 66 MG/DL
LDLC SERPL CALC-MCNC: 134 MG/DL
LYMPHOCYTES # BLD AUTO: 2.7 10E9/L (ref 0.8–5.3)
LYMPHOCYTES NFR BLD AUTO: 19.5 %
MONOCYTES # BLD AUTO: 0.9 10E9/L (ref 0–1.3)
MONOCYTES NFR BLD AUTO: 6.9 %
NEUTROPHILS # BLD AUTO: 9.6 10E9/L (ref 1.6–8.3)
NEUTROPHILS NFR BLD AUTO: 70 %
NONHDLC SERPL-MCNC: 151 MG/DL
POTASSIUM SERPL-SCNC: 4.4 MMOL/L (ref 3.4–5.3)
PROT SERPL-MCNC: 8 G/DL (ref 6.8–8.8)
SODIUM SERPL-SCNC: 138 MMOL/L (ref 133–144)
TRIGL SERPL-MCNC: 83 MG/DL
WBC # BLD AUTO: 13.7 10E9/L (ref 4–11)

## 2021-06-22 NOTE — RESULT ENCOUNTER NOTE
Please call patient with below lab results, has elevated white blood cell count of 13,700, recommend repeat labs in the next 24 to 48 hours.    Scar, reviewed your labs  Lipid panel shows HDL good cholesterol at goal at 66, goal greater than 50, this is a good cholesterol and is atheroprotective to the heart,  LDL which is the bad cholesterol, slightly elevated 134, normal triglyceride.,  Continue with lifestyle changes low-fat low-cholesterol diet exercise activities as tolerated.  Chemistry shows normal electrolytes, kidney function test is stable, please keep well-hydrated, avoid nonsteroidal such as Motrin Advil or Aleve as such medicines can be toxic to the kidney,  Calcium level is normal,  Total protein albumin normal,  Liver enzymes creatinine and AST are both normal  Your CBC shows elevated white blood cell count at 13,700 slight left shift, it is not clear to me why the white blood cell count is slightly elevated, normal is less than 11,000, white blood cell count can increase with infections including viral infections.  Please we need to repeat your CBC with inflammatory marker called CRP in the next 24 hours.  Do you you have any URI symptoms such as cough runny nose or sore throat?  Please schedule/call the lab in the next 24 hours to schedule repeat blood work  Any further questions please let me know  Dr. Bar

## 2021-06-23 ENCOUNTER — TELEPHONE (OUTPATIENT)
Dept: FAMILY MEDICINE | Facility: CLINIC | Age: 41
End: 2021-06-23

## 2021-06-23 NOTE — TELEPHONE ENCOUNTER
Left message to return call to Triage RN.  Patient reviewed lab results via [employer] yesterday (6/22/21).    Caity Johnson RN  [facility]

## 2021-06-23 NOTE — TELEPHONE ENCOUNTER
----- Message from Isiah Bar MD sent at 6/22/2021  4:21 PM CDT -----  Please call patient with below lab results, has elevated white blood cell count of 13,700, recommend repeat labs in the next 24 to 48 hours.    Scar, reviewed your labs  Lipid panel shows HDL good cholesterol at goal at 66, goal greater than 50, this is a good cholesterol and is atheroprotective to the heart,  LDL which is the bad cholesterol, slightly elevated 134, normal triglyceride.,  Continue with lifestyle changes low-fat low-cholesterol diet exercise activities as tolerated.  Chemistry shows normal electrolytes, kidney function test is stable, please keep well-hydrated, avoid nonsteroidal such as Motrin Advil or Aleve as such medicines can be toxic to the kidney,  Calcium level is normal,  Total protein albumin normal,  Liver enzymes creatinine and AST are both normal  Your CBC shows elevated white blood cell count at 13,700 slight left shift, it is not clear to me why the white blood cell count is slightly elevated, normal is less than 11,000, white blood cell count can increase with infections including viral infections.  Please we need to repeat your CBC with inflammatory marker called CRP in the next 24 hours.  Do you you have any URI symptoms such as cough runny nose or sore throat?  Please schedule/call the lab in the next 24 hours to schedule repeat blood work  Any further questions please let me know  Dr. Bar

## 2021-10-02 ENCOUNTER — HEALTH MAINTENANCE LETTER (OUTPATIENT)
Age: 41
End: 2021-10-02

## 2022-08-28 ENCOUNTER — HEALTH MAINTENANCE LETTER (OUTPATIENT)
Age: 42
End: 2022-08-28

## 2023-01-14 ENCOUNTER — HEALTH MAINTENANCE LETTER (OUTPATIENT)
Age: 43
End: 2023-01-14

## 2023-09-30 ENCOUNTER — HEALTH MAINTENANCE LETTER (OUTPATIENT)
Age: 43
End: 2023-09-30

## 2025-06-16 ENCOUNTER — LAB REQUISITION (OUTPATIENT)
Dept: LAB | Facility: CLINIC | Age: 45
End: 2025-06-16
Payer: COMMERCIAL

## 2025-06-16 DIAGNOSIS — D48.5 NEOPLASM OF UNCERTAIN BEHAVIOR OF SKIN: ICD-10-CM

## 2025-06-16 PROCEDURE — 88305 TISSUE EXAM BY PATHOLOGIST: CPT | Mod: 26 | Performed by: DERMATOLOGY

## 2025-06-16 PROCEDURE — 88305 TISSUE EXAM BY PATHOLOGIST: CPT | Mod: TC,ORL | Performed by: DERMATOLOGY

## 2025-06-18 LAB
PATH REPORT.COMMENTS IMP SPEC: NORMAL
PATH REPORT.COMMENTS IMP SPEC: NORMAL
PATH REPORT.FINAL DX SPEC: NORMAL
PATH REPORT.GROSS SPEC: NORMAL
PATH REPORT.MICROSCOPIC SPEC OTHER STN: NORMAL
PATH REPORT.RELEVANT HX SPEC: NORMAL